# Patient Record
Sex: MALE | Race: BLACK OR AFRICAN AMERICAN | NOT HISPANIC OR LATINO | Employment: FULL TIME | URBAN - METROPOLITAN AREA
[De-identification: names, ages, dates, MRNs, and addresses within clinical notes are randomized per-mention and may not be internally consistent; named-entity substitution may affect disease eponyms.]

---

## 2023-10-11 ENCOUNTER — HOSPITAL ENCOUNTER (INPATIENT)
Facility: HOSPITAL | Age: 38
LOS: 1 days | Discharge: HOME | End: 2023-10-13
Attending: EMERGENCY MEDICINE | Admitting: INTERNAL MEDICINE
Payer: COMMERCIAL

## 2023-10-11 DIAGNOSIS — I10 HYPERTENSION: ICD-10-CM

## 2023-10-11 DIAGNOSIS — I16.0 HYPERTENSIVE URGENCY: ICD-10-CM

## 2023-10-11 DIAGNOSIS — R42 DIZZINESS: Primary | ICD-10-CM

## 2023-10-11 PROCEDURE — 96361 HYDRATE IV INFUSION ADD-ON: CPT

## 2023-10-11 PROCEDURE — 96374 THER/PROPH/DIAG INJ IV PUSH: CPT

## 2023-10-11 PROCEDURE — 99284 EMERGENCY DEPT VISIT MOD MDM: CPT

## 2023-10-11 PROCEDURE — 36415 COLL VENOUS BLD VENIPUNCTURE: CPT | Performed by: PHYSICIAN ASSISTANT

## 2023-10-11 PROCEDURE — 84484 ASSAY OF TROPONIN QUANT: CPT | Performed by: PHYSICIAN ASSISTANT

## 2023-10-11 PROCEDURE — 85025 COMPLETE CBC W/AUTO DIFF WBC: CPT | Performed by: PHYSICIAN ASSISTANT

## 2023-10-11 PROCEDURE — 93005 ELECTROCARDIOGRAM TRACING: CPT

## 2023-10-11 PROCEDURE — 80048 BASIC METABOLIC PNL TOTAL CA: CPT | Performed by: PHYSICIAN ASSISTANT

## 2023-10-11 PROCEDURE — 84443 ASSAY THYROID STIM HORMONE: CPT | Performed by: PHYSICIAN ASSISTANT

## 2023-10-11 RX ORDER — ONDANSETRON 2 MG/ML
4 INJECTION INTRAMUSCULAR; INTRAVENOUS ONCE
Status: COMPLETED | OUTPATIENT
Start: 2023-10-11 | End: 2023-10-11

## 2023-10-11 RX ORDER — MECLIZINE HYDROCHLORIDE 25 MG/1
25 TABLET ORAL ONCE
Status: COMPLETED | OUTPATIENT
Start: 2023-10-11 | End: 2023-10-11

## 2023-10-11 RX ADMIN — ONDANSETRON 4 MG: 2 INJECTION INTRAMUSCULAR; INTRAVENOUS at 23:43

## 2023-10-11 RX ADMIN — MECLIZINE HYDROCHLORIDE 25 MG: 25 TABLET ORAL at 23:44

## 2023-10-11 RX ADMIN — SODIUM CHLORIDE 1000 ML: 0.9 INJECTION, SOLUTION INTRAVENOUS at 23:43

## 2023-10-11 NOTE — Clinical Note
Case was discussed with [facility] and the patient's admission status was agreed to be Admission Status: observation status to the service of Dr. Miller

## 2023-10-12 ENCOUNTER — APPOINTMENT (EMERGENCY)
Dept: CT IMAGING | Facility: HOSPITAL | Age: 38
End: 2023-10-12
Payer: COMMERCIAL

## 2023-10-12 PROBLEM — R42 DIZZINESS: Status: ACTIVE | Noted: 2023-10-12

## 2023-10-12 PROBLEM — I16.0 HYPERTENSIVE URGENCY: Status: ACTIVE | Noted: 2023-10-12

## 2023-10-12 LAB
2HR DELTA HS TROPONIN: 1 NG/L
ANION GAP SERPL CALCULATED.3IONS-SCNC: 8 MMOL/L
ATRIAL RATE: 56 BPM
ATRIAL RATE: 84 BPM
BASOPHILS # BLD AUTO: 0.04 THOUSANDS/ÂΜL (ref 0–0.1)
BASOPHILS NFR BLD AUTO: 0 % (ref 0–1)
BUN SERPL-MCNC: 13 MG/DL (ref 5–25)
CALCIUM SERPL-MCNC: 9.2 MG/DL (ref 8.4–10.2)
CARDIAC TROPONIN I PNL SERPL HS: 5 NG/L
CARDIAC TROPONIN I PNL SERPL HS: 6 NG/L
CHLORIDE SERPL-SCNC: 105 MMOL/L (ref 96–108)
CO2 SERPL-SCNC: 25 MMOL/L (ref 21–32)
CREAT SERPL-MCNC: 0.94 MG/DL (ref 0.6–1.3)
EOSINOPHIL # BLD AUTO: 0.14 THOUSAND/ÂΜL (ref 0–0.61)
EOSINOPHIL NFR BLD AUTO: 2 % (ref 0–6)
ERYTHROCYTE [DISTWIDTH] IN BLOOD BY AUTOMATED COUNT: 11.7 % (ref 11.6–15.1)
GFR SERPL CREATININE-BSD FRML MDRD: 101 ML/MIN/1.73SQ M
GLUCOSE SERPL-MCNC: 113 MG/DL (ref 65–140)
HCT VFR BLD AUTO: 42.2 % (ref 36.5–49.3)
HGB BLD-MCNC: 14.2 G/DL (ref 12–17)
IMM GRANULOCYTES # BLD AUTO: 0.04 THOUSAND/UL (ref 0–0.2)
IMM GRANULOCYTES NFR BLD AUTO: 0 % (ref 0–2)
LYMPHOCYTES # BLD AUTO: 4.26 THOUSANDS/ÂΜL (ref 0.6–4.47)
LYMPHOCYTES NFR BLD AUTO: 47 % (ref 14–44)
MCH RBC QN AUTO: 29.1 PG (ref 26.8–34.3)
MCHC RBC AUTO-ENTMCNC: 33.6 G/DL (ref 31.4–37.4)
MCV RBC AUTO: 87 FL (ref 82–98)
MONOCYTES # BLD AUTO: 0.6 THOUSAND/ÂΜL (ref 0.17–1.22)
MONOCYTES NFR BLD AUTO: 7 % (ref 4–12)
NEUTROPHILS # BLD AUTO: 4.05 THOUSANDS/ÂΜL (ref 1.85–7.62)
NEUTS SEG NFR BLD AUTO: 44 % (ref 43–75)
NRBC BLD AUTO-RTO: 0 /100 WBCS
P AXIS: 44 DEGREES
P AXIS: 64 DEGREES
PLATELET # BLD AUTO: 289 THOUSANDS/UL (ref 149–390)
PMV BLD AUTO: 9.9 FL (ref 8.9–12.7)
POTASSIUM SERPL-SCNC: 3.5 MMOL/L (ref 3.5–5.3)
PR INTERVAL: 134 MS
PR INTERVAL: 148 MS
QRS AXIS: 40 DEGREES
QRS AXIS: 51 DEGREES
QRSD INTERVAL: 90 MS
QRSD INTERVAL: 90 MS
QT INTERVAL: 386 MS
QT INTERVAL: 440 MS
QTC INTERVAL: 424 MS
QTC INTERVAL: 456 MS
RBC # BLD AUTO: 4.88 MILLION/UL (ref 3.88–5.62)
SODIUM SERPL-SCNC: 138 MMOL/L (ref 135–147)
T WAVE AXIS: 13 DEGREES
T WAVE AXIS: 13 DEGREES
TSH SERPL DL<=0.05 MIU/L-ACNC: 3.18 UIU/ML (ref 0.45–4.5)
VENTRICULAR RATE: 56 BPM
VENTRICULAR RATE: 84 BPM
WBC # BLD AUTO: 9.13 THOUSAND/UL (ref 4.31–10.16)

## 2023-10-12 PROCEDURE — 70498 CT ANGIOGRAPHY NECK: CPT

## 2023-10-12 PROCEDURE — 36415 COLL VENOUS BLD VENIPUNCTURE: CPT | Performed by: PHYSICIAN ASSISTANT

## 2023-10-12 PROCEDURE — G1004 CDSM NDSC: HCPCS

## 2023-10-12 PROCEDURE — 70496 CT ANGIOGRAPHY HEAD: CPT

## 2023-10-12 PROCEDURE — 93005 ELECTROCARDIOGRAM TRACING: CPT

## 2023-10-12 PROCEDURE — 99285 EMERGENCY DEPT VISIT HI MDM: CPT | Performed by: PHYSICIAN ASSISTANT

## 2023-10-12 PROCEDURE — 84484 ASSAY OF TROPONIN QUANT: CPT | Performed by: PHYSICIAN ASSISTANT

## 2023-10-12 PROCEDURE — 93010 ELECTROCARDIOGRAM REPORT: CPT | Performed by: INTERNAL MEDICINE

## 2023-10-12 PROCEDURE — 99222 1ST HOSP IP/OBS MODERATE 55: CPT | Performed by: INTERNAL MEDICINE

## 2023-10-12 PROCEDURE — 96361 HYDRATE IV INFUSION ADD-ON: CPT

## 2023-10-12 PROCEDURE — 96375 TX/PRO/DX INJ NEW DRUG ADDON: CPT

## 2023-10-12 RX ORDER — SODIUM CHLORIDE 9 MG/ML
100 INJECTION, SOLUTION INTRAVENOUS CONTINUOUS
Status: DISCONTINUED | OUTPATIENT
Start: 2023-10-12 | End: 2023-10-12

## 2023-10-12 RX ORDER — HYDRALAZINE HYDROCHLORIDE 20 MG/ML
10 INJECTION INTRAMUSCULAR; INTRAVENOUS EVERY 6 HOURS PRN
Status: DISCONTINUED | OUTPATIENT
Start: 2023-10-12 | End: 2023-10-13 | Stop reason: HOSPADM

## 2023-10-12 RX ORDER — AMLODIPINE BESYLATE 5 MG/1
5 TABLET ORAL DAILY
Status: DISCONTINUED | OUTPATIENT
Start: 2023-10-12 | End: 2023-10-12

## 2023-10-12 RX ORDER — MAGNESIUM HYDROXIDE/ALUMINUM HYDROXICE/SIMETHICONE 120; 1200; 1200 MG/30ML; MG/30ML; MG/30ML
30 SUSPENSION ORAL EVERY 6 HOURS PRN
Status: DISCONTINUED | OUTPATIENT
Start: 2023-10-12 | End: 2023-10-13 | Stop reason: HOSPADM

## 2023-10-12 RX ORDER — ACETAMINOPHEN 325 MG/1
975 TABLET ORAL EVERY 6 HOURS PRN
Status: DISCONTINUED | OUTPATIENT
Start: 2023-10-12 | End: 2023-10-13

## 2023-10-12 RX ORDER — AMLODIPINE BESYLATE 5 MG/1
5 TABLET ORAL ONCE
Status: COMPLETED | OUTPATIENT
Start: 2023-10-12 | End: 2023-10-12

## 2023-10-12 RX ORDER — HYDROCHLOROTHIAZIDE 25 MG/1
25 TABLET ORAL DAILY
Status: DISCONTINUED | OUTPATIENT
Start: 2023-10-12 | End: 2023-10-13 | Stop reason: HOSPADM

## 2023-10-12 RX ORDER — DIAZEPAM 5 MG/1
5 TABLET ORAL EVERY 6 HOURS PRN
Status: DISCONTINUED | OUTPATIENT
Start: 2023-10-12 | End: 2023-10-13 | Stop reason: HOSPADM

## 2023-10-12 RX ORDER — AMLODIPINE BESYLATE 10 MG/1
10 TABLET ORAL DAILY
Status: DISCONTINUED | OUTPATIENT
Start: 2023-10-13 | End: 2023-10-13 | Stop reason: HOSPADM

## 2023-10-12 RX ORDER — HYDRALAZINE HYDROCHLORIDE 25 MG/1
25 TABLET, FILM COATED ORAL ONCE
Status: COMPLETED | OUTPATIENT
Start: 2023-10-12 | End: 2023-10-12

## 2023-10-12 RX ORDER — MECLIZINE HCL 12.5 MG/1
25 TABLET ORAL EVERY 8 HOURS SCHEDULED
Status: DISCONTINUED | OUTPATIENT
Start: 2023-10-12 | End: 2023-10-13 | Stop reason: HOSPADM

## 2023-10-12 RX ORDER — ONDANSETRON 2 MG/ML
4 INJECTION INTRAMUSCULAR; INTRAVENOUS EVERY 6 HOURS PRN
Status: DISCONTINUED | OUTPATIENT
Start: 2023-10-12 | End: 2023-10-13 | Stop reason: HOSPADM

## 2023-10-12 RX ORDER — DIAZEPAM 5 MG/ML
5 INJECTION, SOLUTION INTRAMUSCULAR; INTRAVENOUS ONCE
Status: COMPLETED | OUTPATIENT
Start: 2023-10-12 | End: 2023-10-12

## 2023-10-12 RX ADMIN — HYDRALAZINE HYDROCHLORIDE 10 MG: 20 INJECTION, SOLUTION INTRAMUSCULAR; INTRAVENOUS at 09:53

## 2023-10-12 RX ADMIN — SODIUM CHLORIDE 100 ML/HR: 0.9 INJECTION, SOLUTION INTRAVENOUS at 07:19

## 2023-10-12 RX ADMIN — HYDROCHLOROTHIAZIDE 25 MG: 25 TABLET ORAL at 15:21

## 2023-10-12 RX ADMIN — IOHEXOL 85 ML: 350 INJECTION, SOLUTION INTRAVENOUS at 00:24

## 2023-10-12 RX ADMIN — HYDRALAZINE HYDROCHLORIDE 25 MG: 25 TABLET, FILM COATED ORAL at 06:19

## 2023-10-12 RX ADMIN — AMLODIPINE BESYLATE 5 MG: 5 TABLET ORAL at 15:21

## 2023-10-12 RX ADMIN — AMLODIPINE BESYLATE 5 MG: 5 TABLET ORAL at 08:35

## 2023-10-12 RX ADMIN — ACETAMINOPHEN 325MG 975 MG: 325 TABLET ORAL at 21:30

## 2023-10-12 RX ADMIN — DIAZEPAM 5 MG: 5 INJECTION INTRAMUSCULAR; INTRAVENOUS at 03:13

## 2023-10-12 RX ADMIN — MECLIZINE HYDROCHLORIDE 25 MG: 25 TABLET ORAL at 06:42

## 2023-10-12 NOTE — PLAN OF CARE
Problem: NEUROSENSORY - ADULT  Goal: Achieves stable or improved neurological status  Description: INTERVENTIONS  - Monitor and report changes in neurological status  - Monitor vital signs such as temperature, blood pressure, glucose, and any other labs ordered   - Initiate measures to prevent increased intracranial pressure  - Monitor for seizure activity and implement precautions if appropriate      Outcome: Progressing     Problem: CARDIOVASCULAR - ADULT  Goal: Maintains optimal cardiac output and hemodynamic stability  Description: INTERVENTIONS:  - Monitor I/O, vital signs and rhythm  - Monitor for S/S and trends of decreased cardiac output  - Administer and titrate ordered vasoactive medications to optimize hemodynamic stability  - Assess quality of pulses, skin color and temperature  - Assess for signs of decreased coronary artery perfusion  - Instruct patient to report change in severity of symptoms  Outcome: Progressing     Problem: SAFETY ADULT  Goal: Patient will remain free of falls  Description: INTERVENTIONS:  - Educate patient/family on patient safety including physical limitations  - Instruct patient to call for assistance with activity   - Consult OT/PT to assist with strengthening/mobility   - Keep Call bell within reach  - Keep bed low and locked with side rails adjusted as appropriate  - Keep care items and personal belongings within reach  - Initiate and maintain comfort rounds  - Make Fall Risk Sign visible to staff  - Offer Toileting every 2 Hours, in advance of need  - Initiate/Maintain bed alarm  - Obtain necessary fall risk management equipment: alarm  - Apply yellow socks and bracelet for high fall risk patients  - Consider moving patient to room near nurses station  Outcome: Progressing  Goal: Maintain or return to baseline ADL function  Description: INTERVENTIONS:  -  Assess patient's ability to carry out ADLs; assess patient's baseline for ADL function and identify physical deficits which impact ability to perform ADLs (bathing, care of mouth/teeth, toileting, grooming, dressing, etc.)  - Assess/evaluate cause of self-care deficits   - Assess range of motion  - Assess patient's mobility; develop plan if impaired  - Assess patient's need for assistive devices and provide as appropriate  - Encourage maximum independence but intervene and supervise when necessary  - Involve family in performance of ADLs  - Assess for home care needs following discharge   - Consider OT consult to assist with ADL evaluation and planning for discharge  - Provide patient education as appropriate  Outcome: Progressing  Goal: Maintains/Returns to pre admission functional level  Description: INTERVENTIONS:  - Perform BMAT or MOVE assessment daily.   - Set and communicate daily mobility goal to care team and patient/family/caregiver. - Collaborate with rehabilitation services on mobility goals if consulted  - Perform Range of Motion 3 times a day. - Reposition patient every 2 hours.   - Dangle patient 3 times a day  - Stand patient 3 times a day  - Ambulate patient 3 times a day  - Out of bed to chair 3 times a day   - Out of bed for meals 3 times a day  - Out of bed for toileting  - Record patient progress and toleration of activity level   Outcome: Progressing     Problem: DISCHARGE PLANNING  Goal: Discharge to home or other facility with appropriate resources  Description: INTERVENTIONS:  - Identify barriers to discharge w/patient and caregiver  - Arrange for needed discharge resources and transportation as appropriate  - Identify discharge learning needs (meds, wound care, etc.)  - Arrange for interpretive services to assist at discharge as needed  - Refer to Case Management Department for coordinating discharge planning if the patient needs post-hospital services based on physician/advanced practitioner order or complex needs related to functional status, cognitive ability, or social support system  Outcome: Progressing     Problem: Knowledge Deficit  Goal: Patient/family/caregiver demonstrates understanding of disease process, treatment plan, medications, and discharge instructions  Description: Complete learning assessment and assess knowledge base.   Interventions:  - Provide teaching at level of understanding  - Provide teaching via preferred learning methods  Outcome: Progressing

## 2023-10-12 NOTE — H&P
[facility]  H&P  Name: Melissa Morales 34 y.o. male I MRN: [id number]  Unit/Bed#: ED-15 I Date of Admission: 10/11/2023   Date of Service: 10/12/2023 I Hospital Day: 0      Assessment/Plan   * Hypertensive urgency  Assessment & Plan  /103, 186/110  Reports hx of elevated BP. Not maintained on antihypertensive  Start amlodipine 5 mg daily  Close BP monitoring    Dizziness  Assessment & Plan  Dizziness reported. +Nystagmus. No focal deficits  CTA H/N unremarkable  Reports some improvement with meclizine and Valium   Scheduled meclizine for now  Valium as needed  Neurochecks  Obtain orthostatic vital signs  PT OT consult  Vestibular rehab therapy         VTE Prophylaxis:  low risk score   / reason for no mechanical VTE prophylaxis ambulatory    Code Status: FC  POLST: POLST is not applicable to this patient  Discussion with family:     Anticipated Length of Stay:  Patient will be admitted on an Observation basis with an anticipated length of stay of  < 2 midnights. Justification for Hospital Stay: htn urgency, dizziness/peripheral vertigo    Total Time for Visit, including Counseling / Coordination of Care: 30 minutes. Greater than 50% of this total time spent on direct patient counseling and coordination of care. Chief Complaint:   dizziness    History of Present Illness:    Melissa Morales is a 34 y.o. male who presents with c/o dizziness. Unable to obtain history due to Valium given in ED. Review of Systems:    Review of Systems   Unable to perform ROS: Other       Past Medical and Surgical History:     History reviewed. No pertinent past medical history. History reviewed. No pertinent surgical history. Meds/Allergies:    Prior to Admission medications    Not on File     I have reviewed home medications using allscripts.     Allergies: No Known Allergies    Social History:     Marital Status: Single   Occupation:   Patient Pre-hospital Living Situation:   Patient Pre-hospital Level of Mobility: ambulatory  Patient Pre-hospital Diet Restrictions:   Substance Use History:   Social History     Substance and Sexual Activity   Alcohol Use None     Social History     Tobacco Use   Smoking Status Not on file   Smokeless Tobacco Not on file     Social History     Substance and Sexual Activity   Drug Use Not on file       Family History:    History reviewed. No pertinent family history. Physical Exam:     Vitals:   Blood Pressure: (!) 184/109 (10/12/23 0618)  Pulse: 57 (10/12/23 0618)  Temperature: 97.7 °F (36.5 °C) (10/12/23 0052)  Temp Source: Oral (10/12/23 0052)  Respirations: 16 (10/12/23 0618)  Weight - Scale: 102 kg (225 lb 15.5 oz) (10/11/23 2257)  SpO2: 95 % (10/12/23 0618)    Physical Exam  Constitutional:       General: He is not in acute distress. Appearance: Normal appearance. He is not ill-appearing, toxic-appearing or diaphoretic. HENT:      Head: Normocephalic and atraumatic. Nose: No congestion. Mouth/Throat:      Mouth: Mucous membranes are moist.   Eyes:      Comments: +nystagmus   Cardiovascular:      Rate and Rhythm: Normal rate and regular rhythm. Pulmonary:      Effort: Pulmonary effort is normal.      Breath sounds: Normal breath sounds. Abdominal:      General: Bowel sounds are normal.      Palpations: Abdomen is soft. Musculoskeletal:      Right lower leg: No edema. Left lower leg: No edema. Neurological:      Mental Status: He is alert and oriented to person, place, and time. Psychiatric:      Comments: Sedated       Additional Data:     Lab Results: I have personally reviewed pertinent reports.       Results from last 7 days   Lab Units 10/11/23  2341   WBC Thousand/uL 9.13   HEMOGLOBIN g/dL 14.2   HEMATOCRIT % 42.2   PLATELETS Thousands/uL 289   NEUTROS PCT % 44   LYMPHS PCT % 47*   MONOS PCT % 7   EOS PCT % 2     Results from last 7 days   Lab Units 10/11/23  2341   SODIUM mmol/L 138   POTASSIUM mmol/L 3.5   CHLORIDE mmol/L 105   CO2 mmol/L 25   BUN mg/dL 13   CREATININE mg/dL 0.94   ANION GAP mmol/L 8   CALCIUM mg/dL 9.2   GLUCOSE RANDOM mg/dL 113                       Imaging: I have personally reviewed pertinent reports. CTA head and neck with and without contrast   Final Result by Alma Delia Mary MD (10/12 0102)      No acute intracranial abnormality. Unremarkable CT angiogram of the head and neck. Workstation performed: [id number]             EKG, Pathology, and Other Studies Reviewed on Admission:   CTA H/N    Allscripts / Epic Records Reviewed: Yes     ** Please Note: This note has been constructed using a voice recognition system.  **

## 2023-10-12 NOTE — ASSESSMENT & PLAN NOTE
Dizziness reported. +Nystagmus.  No focal deficits  CTA H/N unremarkable  Reports some improvement with meclizine and Valium   Scheduled meclizine for now  Valium as needed  Neurochecks  Obtain orthostatic vital signs  PT OT consult  Vestibular rehab therapy

## 2023-10-12 NOTE — ED CARE HANDOFF
Emergency Department Sign Out Note        Sign out and transfer of care from [address] [place], [place] [address]. See Separate Emergency Department note. The patient, Garrick Pandya, was evaluated by the previous provider for dizziness, nausea, vomiting. Workup Completed:    Labs Reviewed   CBC AND DIFFERENTIAL - Abnormal       Result Value Ref Range Status    WBC 9.13  4.31 - 10.16 Thousand/uL Final    RBC 4.88  3.88 - 5.62 Million/uL Final    Hemoglobin 14.2  12.0 - 17.0 g/dL Final    Hematocrit 42.2  36.5 - 49.3 % Final    MCV 87  82 - 98 fL Final    MCH 29.1  26.8 - 34.3 pg Final    MCHC 33.6  31.4 - 37.4 g/dL Final    RDW 11.7  11.6 - 15.1 % Final    MPV 9.9  8.9 - 12.7 fL Final    Platelets 321  761 - 390 Thousands/uL Final    nRBC 0  /100 WBCs Final    Neutrophils Relative 44  43 - 75 % Final    Immat GRANS % 0  0 - 2 % Final    Lymphocytes Relative 47 (*) 14 - 44 % Final    Monocytes Relative 7  4 - 12 % Final    Eosinophils Relative 2  0 - 6 % Final    Basophils Relative 0  0 - 1 % Final    Neutrophils Absolute 4.05  1.85 - 7.62 Thousands/µL Final    Immature Grans Absolute 0.04  0.00 - 0.20 Thousand/uL Final    Lymphocytes Absolute 4.26  0.60 - 4.47 Thousands/µL Final    Monocytes Absolute 0.60  0.17 - 1.22 Thousand/µL Final    Eosinophils Absolute 0.14  0.00 - 0.61 Thousand/µL Final    Basophils Absolute 0.04  0.00 - 0.10 Thousands/µL Final   TSH, 3RD GENERATION WITH FREE T4 REFLEX - Normal    TSH 3RD GENERATON 3.177  0.450 - 4.500 uIU/mL Final    Comment: Adult TSH (3rd generation) reference range follows the recommended guidelines of the American Thyroid Association, January, 2020. HS TROPONIN I 0HR - Normal    hs TnI 0hr 5  "Refer to ACS Flowchart"- see link ng/L Final    Comment:                                              Initial (time 0) result  If >=50 ng/L, Myocardial injury suggested ;  Type of myocardial injury and treatment strategy  to be determined.   If 5-49 ng/L, a delta result at 2 hours and or 4 hours will be needed to further evaluate. If <4 ng/L, and chest pain has been >3 hours since onset, patient may qualify for discharge based on the HEART score in the ED. If <5 ng/L and <3hours since onset of chest pain, a delta result at 2 hours will be needed to further evaluate. HS Troponin 99th Percentile URL of a Health Population=12 ng/L with a 95% Confidence Interval of 8-18 ng/L. Second Troponin (time 2 hours)  If calculated delta >= 20 ng/L,  Myocardial injury suggested ; Type of myocardial injury and treatment strategy to be determined. If 5-49 ng/L and the calculated delta is 5-19 ng/L, consult medical service for evaluation. Continue evaluation for ischemia on ecg and other possible etiology and repeat hs troponin at 4 hours. If delta is <5 ng/L at 2 hours, consider discharge based on risk stratification via the HEART score (if in ED), or KARI risk score in IP/Observation. HS Troponin 99th Percentile URL of a Health Population=12 ng/L with a 95% Confidence Interval of 8-18 ng/L.   HS TROPONIN I 2HR - Normal    hs TnI 2hr 6  "Refer to ACS Flowchart"- see link ng/L Final    Comment:                                              Initial (time 0) result  If >=50 ng/L, Myocardial injury suggested ;  Type of myocardial injury and treatment strategy  to be determined. If 5-49 ng/L, a delta result at 2 hours and or 4 hours will be needed to further evaluate. If <4 ng/L, and chest pain has been >3 hours since onset, patient may qualify for discharge based on the HEART score in the ED. If <5 ng/L and <3hours since onset of chest pain, a delta result at 2 hours will be needed to further evaluate. HS Troponin 99th Percentile URL of a Health Population=12 ng/L with a 95% Confidence Interval of 8-18 ng/L. Second Troponin (time 2 hours)  If calculated delta >= 20 ng/L,  Myocardial injury suggested ; Type of myocardial injury and treatment strategy to be determined.   If 5-49 ng/L and the calculated delta is 5-19 ng/L, consult medical service for evaluation. Continue evaluation for ischemia on ecg and other possible etiology and repeat hs troponin at 4 hours. If delta is <5 ng/L at 2 hours, consider discharge based on risk stratification via the HEART score (if in ED), or KARI risk score in IP/Observation. HS Troponin 99th Percentile URL of a Health Population=12 ng/L with a 95% Confidence Interval of 8-18 ng/L. Delta 2hr hsTnI 1  <20 ng/L Final   BASIC METABOLIC PANEL    Sodium 289  135 - 147 mmol/L Final    Potassium 3.5  3.5 - 5.3 mmol/L Final    Chloride 105  96 - 108 mmol/L Final    CO2 25  21 - 32 mmol/L Final    ANION GAP 8  mmol/L Final    BUN 13  5 - 25 mg/dL Final    Creatinine 0.94  0.60 - 1.30 mg/dL Final    Comment: Standardized to IDMS reference method    Glucose 113  65 - 140 mg/dL Final    Comment: If the patient is fasting, the ADA then defines impaired fasting glucose as > 100 mg/dL and diabetes as > or equal to 123 mg/dL. Calcium 9.2  8.4 - 10.2 mg/dL Final    eGFR 101  ml/min/1.73sq m Final    Narrative:     [place] guidelines for Chronic Kidney Disease (CKD):     Stage 1 with normal or high GFR (GFR > 90 mL/min/1.73 square meters)    Stage 2 Mild CKD (GFR = 60-89 mL/min/1.73 square meters)    Stage 3A Moderate CKD (GFR = 45-59 mL/min/1.73 square meters)    Stage 3B Moderate CKD (GFR = 30-44 mL/min/1.73 square meters)    Stage 4 Severe CKD (GFR = 15-29 mL/min/1.73 square meters)    Stage 5 End Stage CKD (GFR <15 mL/min/1.73 square meters)  Note: GFR calculation is accurate only with a steady state creatinine         CTA head and neck with and without contrast   Final Result      No acute intracranial abnormality. Unremarkable CT angiogram of the head and neck. Workstation performed: [id number]               ED Course / Workup Pending (followup):          Pending:  CTA head/neck  Delta troponin    If normal imaging/delta trop then plan for d/c. Send with mary beth   Referral to [facility]                                 ED Course as of 10/12/23 2027   u Oct 12, 2023   0136 CTA IMPRESSION:     No acute intracranial abnormality. Unremarkable CT angiogram of the head and neck. 0225 Delta 2hr hsTnI: 1  2-hour EKG appeared nonischemic. Will defer further troponin testing.   0243 On bedside reevaluation patient resting comfortably in stretcher no acute distress. Reports his nausea has improved however symptoms of dizziness have persisted. Reports they are mild at rest and without movement but upon head movement and standing reports that the dizziness is exacerbated. No other complaints at this time. Communicate all findings from today's work with the patient at bedside. In light of persistent elevated blood pressure while in the ED along with persistent vertiginous symptoms will admit patient to medicine for reevaluation and further management. Patient agreeable to this plan. Will reach out to [facility].    [facility] 12 Lead Documentation Only    Date/Time: 10/12/2023 2:22 AM    Performed by: Ike Irvin PA-C  Authorized by: Ike Irvin PA-C    Indications / Diagnosis:  2 hour  ECG reviewed by me, the ED Provider: yes    Patient location:  ED  Rate:     ECG rate:  56    ECG rate assessment: bradycardic    Rhythm:     Rhythm: sinus bradycardia    Ectopy:     Ectopy: none    QRS:     QRS axis:  Normal    QRS intervals:  Normal  Conduction:     Conduction: normal    ST segments:     ST segments:  Normal  T waves:     T waves: inverted      Inverted:  III    Medical Decision Making  Amount and/or Complexity of Data Reviewed  Labs: ordered. Decision-making details documented in ED Course. Radiology: ordered. Risk  Prescription drug management. Decision regarding hospitalization.             Disposition  Final diagnoses:   Dizziness   Hypertension     Time reflects when diagnosis was documented in both MDM as applicable and the Disposition within this note       Time User Action Codes Description Comment    10/12/2023  7:02 AM Amina Mccoy [[id number]] Dizziness     10/12/2023  7:02 AM Amina Mccoy [[id number]] Hypertension           ED Disposition       ED Disposition   Admit    Condition   Stable    Date/Time   Thu Oct 12, 2023  7:02 AM    Comment   Case was discussed with [facility] and the patient's admission status was agreed to be Admission Status: observation status to the service of Dr. Manjeet Johnson . Follow-up Information    None       There are no discharge medications for this patient. No discharge procedures on file.        ED Provider  Electronically Signed by     Merary Thompson PA-C  10/12/23 2027

## 2023-10-12 NOTE — ASSESSMENT & PLAN NOTE
/103, 186/110  Reports hx of elevated BP.  Not maintained on antihypertensive  Start amlodipine 5 mg daily  Close BP monitoring

## 2023-10-12 NOTE — ED PROVIDER NOTES
History  Chief Complaint   Patient presents with    Dizziness     Pt arriving via ems c/o of dizziness that started an hour ago. Pt having nausea and one episode of vomiting. Intermittent headache. Denis Angelo is a 35 yo M presenting with dizziness with onset one hour prior to arrival. Reports yesterday he had a similar episode which lasted about 10 minutes but resolved spontaneously. Reports today was laying in bed and had abrupt onset of spinning. He describes associated nausea and episode of vomiting. Reports the dizziness does worsen with movements of head and positional changes, improves somewhat with laying still. Reports he has had slight discomfort in neck bilaterally since this morning, but no specific neck/head injury. No headaches noted. No numbness/tingling/weakness. No chest pain/dyspnea. Apart from yesterday, no previous history of similar vertigo. Reports elevated BP at previous physicals as well as today at triage, but does not follow with PCP and is not on antihypertensives. History provided by:  Patient   used: No        None       History reviewed. No pertinent past medical history. History reviewed. No pertinent surgical history. History reviewed. No pertinent family history. I have reviewed and agree with the history as documented. E-Cigarette/Vaping     E-Cigarette/Vaping Substances          Review of Systems   Constitutional:  Negative for chills and fever. HENT:  Negative for congestion, rhinorrhea and sore throat. Eyes:  Negative for pain and visual disturbance. Respiratory:  Negative for cough, shortness of breath and wheezing. Cardiovascular:  Negative for chest pain and palpitations. Gastrointestinal:  Negative for abdominal pain, nausea and vomiting. Genitourinary:  Negative for dysuria, frequency and urgency. Musculoskeletal:  Positive for neck pain. Negative for back pain and neck stiffness. Skin:  Negative for rash and wound. Neurological:  Positive for dizziness. Negative for weakness, light-headedness and numbness. Physical Exam  Physical Exam  Constitutional:       General: He is not in acute distress. Appearance: He is well-developed. He is not diaphoretic. HENT:      Head: Normocephalic and atraumatic. Right Ear: External ear normal.      Left Ear: External ear normal.   Eyes:      Extraocular Movements:      Right eye: Nystagmus present. Normal extraocular motion. Left eye: Nystagmus present. Normal extraocular motion. Conjunctiva/sclera: Conjunctivae normal.      Pupils: Pupils are equal, round, and reactive to light. Comments: Unidirectional leftward nystagmus. EOM's otherwise intact. No vertical nystagmus   Cardiovascular:      Rate and Rhythm: Normal rate and regular rhythm. Pulmonary:      Effort: Pulmonary effort is normal. No accessory muscle usage or respiratory distress. Breath sounds: No wheezing or rales. Abdominal:      General: Abdomen is flat. There is no distension. Tenderness: There is no abdominal tenderness. Musculoskeletal:      Cervical back: Normal range of motion. No rigidity. Skin:     General: Skin is warm and dry. Capillary Refill: Capillary refill takes less than 2 seconds. Findings: No erythema or rash. Neurological:      Mental Status: He is alert and oriented to person, place, and time. GCS: GCS eye subscore is 4. GCS verbal subscore is 5. GCS motor subscore is 6. Cranial Nerves: Cranial nerves 2-12 are intact. No cranial nerve deficit or facial asymmetry. Sensory: Sensation is intact. Motor: Motor function is intact. No weakness or abnormal muscle tone. Coordination: Coordination is intact. Coordination normal. Finger-Nose-Finger Test and Heel to [facility] Test normal.      Gait: Gait is intact. Psychiatric:         Behavior: Behavior normal.         Thought Content:  Thought content normal.         Judgment: Judgment normal.         Vital Signs  ED Triage Vitals [10/11/23 2257]   Temp Pulse Respirations Blood Pressure SpO2   -- 84 18 (!) 186/103 100 %      Temp src Heart Rate Source Patient Position - Orthostatic VS BP Location FiO2 (%)   -- -- Lying Left arm --      Pain Score       No Pain           Vitals:    10/11/23 2257 10/11/23 2300 10/11/23 2301   BP: (!) 186/103 (!) 186/110 (!) 162/106   Pulse: 84     Patient Position - Orthostatic VS: Lying Lying - Orthostatic VS Sitting - Orthostatic VS         Visual Acuity      ED Medications  Medications   meclizine (ANTIVERT) tablet 25 mg (25 mg Oral Given 10/11/23 2344)   sodium chloride 0.9 % bolus 1,000 mL (1,000 mL Intravenous New Bag 10/11/23 2343)   ondansetron (ZOFRAN) injection 4 mg (4 mg Intravenous Given 10/11/23 2343)   iohexol (OMNIPAQUE) 350 MG/ML injection (MULTI-DOSE) 85 mL (85 mL Intravenous Given 10/12/23 0024)       Diagnostic Studies  Results Reviewed       Procedure Component Value Units Date/Time    TSH, 3rd generation with Free T4 reflex [[id number]]  (Normal) Collected: 10/11/23 2341    Lab Status: Final result Specimen: Blood from Arm, Right Updated: 10/12/23 0023     TSH 3RD GENERATON 3.177 uIU/mL     HS Troponin I 2hr [[id number]]     Lab Status: No result Specimen: Blood     HS Troponin I 4hr [[id number]]     Lab Status: No result Specimen: Blood     HS Troponin 0hr (reflex protocol) [[id number]]  (Normal) Collected: 10/11/23 2341    Lab Status: Final result Specimen: Blood from Arm, Right Updated: 10/12/23 0014     hs TnI 0hr 5 ng/L     CBC and differential [[id number]]  (Abnormal) Collected: 10/11/23 2341    Lab Status: Final result Specimen: Blood from Arm, Right Updated: 10/12/23 0009     WBC 9.13 Thousand/uL      RBC 4.88 Million/uL      Hemoglobin 14.2 g/dL      Hematocrit 42.2 %      MCV 87 fL      MCH 29.1 pg      MCHC 33.6 g/dL      RDW 11.7 %      MPV 9.9 fL      Platelets 642 Thousands/uL      nRBC 0 /100 WBCs      Neutrophils Relative 44 % Immat GRANS % 0 %      Lymphocytes Relative 47 %      Monocytes Relative 7 %      Eosinophils Relative 2 %      Basophils Relative 0 %      Neutrophils Absolute 4.05 Thousands/µL      Immature Grans Absolute 0.04 Thousand/uL      Lymphocytes Absolute 4.26 Thousands/µL      Monocytes Absolute 0.60 Thousand/µL      Eosinophils Absolute 0.14 Thousand/µL      Basophils Absolute 0.04 Thousands/µL     Basic metabolic panel [[id number]] Collected: 10/11/23 2341    Lab Status: Final result Specimen: Blood from Arm, Right Updated: 10/12/23 0006     Sodium 138 mmol/L      Potassium 3.5 mmol/L      Chloride 105 mmol/L      CO2 25 mmol/L      ANION GAP 8 mmol/L      BUN 13 mg/dL      Creatinine 0.94 mg/dL      Glucose 113 mg/dL      Calcium 9.2 mg/dL      eGFR 101 ml/min/1.73sq m     Narrative:      [place] guidelines for Chronic Kidney Disease (CKD):     Stage 1 with normal or high GFR (GFR > 90 mL/min/1.73 square meters)    Stage 2 Mild CKD (GFR = 60-89 mL/min/1.73 square meters)    Stage 3A Moderate CKD (GFR = 45-59 mL/min/1.73 square meters)    Stage 3B Moderate CKD (GFR = 30-44 mL/min/1.73 square meters)    Stage 4 Severe CKD (GFR = 15-29 mL/min/1.73 square meters)    Stage 5 End Stage CKD (GFR <15 mL/min/1.73 square meters)  Note: GFR calculation is accurate only with a steady state creatinine                   CTA head and neck with and without contrast    (Results Pending)              Procedures  ECG 12 Lead Documentation Only    Date/Time: 10/11/2023 11:05 PM    Performed by: Jorge Archer PA-C  Authorized by: Jorge Archer PA-C    Indications / Diagnosis:  Dizziness  ECG reviewed by me, the ED Provider: yes    Patient location:  ED  Previous ECG:     Previous ECG:  Unavailable    Comparison to cardiac monitor: Yes    Interpretation:     Interpretation: normal    Quality:     Tracing quality:  Limited by artifact  Rate:     ECG rate:  84    ECG rate assessment: normal    Rhythm: Rhythm: sinus rhythm    Ectopy:     Ectopy: none    QRS:     QRS axis:  Normal    QRS intervals:  Normal  Conduction:     Conduction: normal    ST segments:     ST segments:  Normal  T waves:     T waves: normal             ED Course  ED Course as of 10/12/23 0046   Thu Oct 12, 2023   0045 Pt signed out to [employer] PA-C pending CTA, re-evaluation, delta troponin. Symptoms improved significantly since arrival after meclizine, nausea resolved. SBIRT 20yo+      Flowsheet Row Most Recent Value   Initial Alcohol Screen: US AUDIT-C     1. How often do you have a drink containing alcohol? 0 Filed at: 10/11/2023 2257   2. How many drinks containing alcohol do you have on a typical day you are drinking? 0 Filed at: 10/11/2023 2257   3a. Male UNDER 65: How often do you have five or more drinks on one occasion? 0 Filed at: 10/11/2023 2257   Audit-C Score 0 Filed at: 10/11/2023 2257   DEE: How many times in the past year have you. .. Used an illegal drug or used a prescription medication for non-medical reasons? Never Filed at: 10/11/2023 2257                      Medical Decision Making  Dizziness, initial short episode yesterday resolved and then recurrent today about 1 hour prior to arrival. Symptoms do appear positional in nature. Leftward unidirectional nystagmus noted, neurologic exam otherwise nonfocal. History/exam suggestive of peripheral vertigo. Given possible HTN history, new onset vertigo, and associated mild neck pain today plan for CTA head/neck. Will check labs including CBC for HgB, BMP for lytes, TSH for thyroid dysfunction, EKG/troponin for cardiac ischemia/ectopy. Will give meclizine/Zofran, IV fluids, reassess. Amount and/or Complexity of Data Reviewed  Labs: ordered. Radiology: ordered. Risk  Prescription drug management.              Disposition  Final diagnoses:   None     ED Disposition       None          Follow-up Information    None Patient's Medications    No medications on file       No discharge procedures on file.     PDMP Review       None            ED Provider  Electronically Signed by             Nilda Hopson PA-C  10/12/23 6109

## 2023-10-12 NOTE — ED NOTES
Pt ambulated to the bathroom without assistance.  PT states "I feel more steady on my feel and feel less vertigo symptoms"     Aleksey Deng RN  10/12/23 2099

## 2023-10-12 NOTE — UTILIZATION REVIEW
Initial Clinical Review    ED TREATMENT TIME 10/11 @  2319    Admission: Date/Time/Statement:   Admission Orders (From admission, onward)       Ordered        10/12/23 0628  Place in Observation  Once                          10/12/23 1604  Inpatient Admission  Once        Transfer Service: Hospitalist   Question Answer Comment   Level of Care Med Surg    Estimated length of stay More than 2 Midnights    Certification I certify that inpatient services are medically necessary for this patient for a duration of greater than two midnights. See H&P and MD Progress Notes for additional information about the patient's course of treatment. 10/12/23 1603     [facility]  ED Arrival Information       Expected   -    Arrival   10/11/2023 22:54    Acuity   Urgent              Means of arrival   Ambulance    Escorted by   [facility] ([address] [place] [address])    Service   Hospitalist    Admission type   Emergency              Arrival complaint   Dizzy             Chief Complaint   Patient presents with    Dizziness     Pt arriving via ems c/o of dizziness that started an hour ago. Pt having nausea and one episode of vomiting. Intermittent headache. Initial Presentation: 34 y.o. male presents to ED from home with Dizziness. Unable to obatin history after  receiving valium in ED. CTA  head  unremarkable. BP  in ED   186/103, 186/110. Has  H/O  hypertension. Admit  Observation with  Hypertensive urgency, dizziness and plan is  PT/OT, vestibular rehab therapy, monitor BP, neuro checks, orthostatic  vital signs and start amlodipine. 10/12  IP  ADMISSIOn  Continue  neuro checks  Q 4 hrs. Monitor  BP. Needs  PT/OT. Continue meclizine, valium as needed.         ED Triage Vitals   Temperature Pulse Respirations Blood Pressure SpO2   10/12/23 0052 10/11/23 2257 10/11/23 2257 10/11/23 2257 10/11/23 2257   97.7 °F (36.5 °C) 84 18 (!) 186/103 100 %      Temp Source Heart Rate Source Patient Position - Orthostatic VS BP Location FiO2 (%)   10/12/23 0052 10/12/23 0052 10/11/23 2257 10/11/23 2257 --   Oral Monitor Lying Left arm       Pain Score       10/11/23 2257       No Pain          Wt Readings from Last 1 Encounters:   10/11/23 102 kg (225 lb 15.5 oz)     Additional Vital Signs:   -- 58 18 162/95 134 97 % None (Room air) Lying    10/12/23 0835 -- -- -- 162/95 -- -- -- --   10/12/23 0721 -- 90 16 195/112 Abnormal  138 97 % None (Room air) Standing   10/12/23 0719 -- -- -- 195/112 Abnormal  -- -- -- Standing for 3 minutes - Orthostatic VS   10/12/23 0714 -- 82 18 165/107 Abnormal  -- -- -- --   10/12/23 0712 -- 68 14 161/95 -- -- -- Sitting - Orthostatic VS   10/12/23 0710 -- 54 Abnormal  14 167/90 -- -- -- Lying - Orthostatic VS   10/12/23 0641 -- -- -- 161/103 Abnormal  -- -- -- Lying   10/12/23 0618 -- 57 16 184/109 Abnormal  -- 95 % None (Room air) Lying   10/12/23 0510 -- 60 16 169/111 Abnormal  -- 99 % None (Room air) Lying   10/12/23 0315 -- 60 20 173/111 Abnormal  136 97 % None (Room air) Lying   10/12/23 0300 -- 63 17 175/109 Abnormal  134 97 % None (Room air) Lying   10/12/23 0245 -- 64 16 185/98 Abnormal  131 97 % None (Room air) Lying   10/12/23 0230 -- 71 16 172/99 Abnormal  130 95 % None (Room air) Lying   10/12/23 0215 -- 59 16 157/101 Abnormal  125 95 % None (Room air) Lying   10/12/23 0145 -- 50 Abnormal  15 171/106 Abnormal  134 97 % None (Room air) Lying   10/12/23 0130 -- 55 16 174/106 Abnormal  134 97 % None (Room air) Lying   10/12/23 0115 -- 60 20 193/111 Abnormal  146 99 % None (Room air) Lying   10/12/23 0100 -- 51 Abnormal  18 185/120 Abnormal  148 98 % None (Room air) Lying   10/12/23 0052 97.7 °F (36.5 °C) 52 Abnormal  18 181/118 Abnormal  -- 99 % None (Room air) Lying   10/11/23 2301 -- -- -- 162/106 Abnormal  -- -- -- Sitting - Orthostatic VS   10/11/23 2300 -- -- -- 186/110 Abnormal  -- -- -- Lying - Orthostatic VS   10/11/23 2257 -- 84 18 186/103 Abnormal  -- 100 % None (Room air) Lying       Pertinent Labs/Diagnostic Test Results:   CTA head and neck with and without contrast   Final Result by Bibi Mendoza MD (10/12 0102)      No acute intracranial abnormality. Unremarkable CT angiogram of the head and neck.             Workstation performed: [id number]               Results from last 7 days   Lab Units 10/11/23  2341   WBC Thousand/uL 9.13   HEMOGLOBIN g/dL 14.2   HEMATOCRIT % 42.2   PLATELETS Thousands/uL 289   NEUTROS ABS Thousands/µL 4.05         Results from last 7 days   Lab Units 10/11/23  2341   SODIUM mmol/L 138   POTASSIUM mmol/L 3.5   CHLORIDE mmol/L 105   CO2 mmol/L 25   ANION GAP mmol/L 8   BUN mg/dL 13   CREATININE mg/dL 0.94   EGFR ml/min/1.73sq m 101   CALCIUM mg/dL 9.2             Results from last 7 days   Lab Units 10/11/23  2341   GLUCOSE RANDOM mg/dL 113               Results from last 7 days   Lab Units 10/12/23  0133 10/11/23  2341   HS TNI 0HR ng/L  --  5   HS TNI 2HR ng/L 6  --    HSTNI D2 ng/L 1  --              Results from last 7 days   Lab Units 10/11/23  2341   TSH 3RD GENERATON uIU/mL 3.177                 ED Treatment:   Medication Administration from 10/11/2023 2254 to 10/12/2023 0911         Date/Time Order Dose Route Action Comments     10/11/2023 2344 EDT meclizine (ANTIVERT) tablet 25 mg 25 mg Oral Given --     10/12/2023 0205 EDT sodium chloride 0.9 % bolus 1,000 mL 0 mL Intravenous Stopped --     10/11/2023 2343 EDT sodium chloride 0.9 % bolus 1,000 mL 1,000 mL Intravenous New Bag --     10/11/2023 2343 EDT ondansetron (ZOFRAN) injection 4 mg 4 mg Intravenous Given --     10/12/2023 0024 EDT iohexol (OMNIPAQUE) 350 MG/ML injection (MULTI-DOSE) 85 mL 85 mL Intravenous Given --     10/12/2023 0313 EDT diazepam (VALIUM) injection 5 mg 5 mg Intravenous Given --     10/12/2023 6616 EDT hydrALAZINE (APRESOLINE) tablet 25 mg 25 mg Oral Given --     10/12/2023 3599 EDT meclizine (ANTIVERT) tablet 25 mg 25 mg Oral Given --     10/12/2023 0719 EDT sodium chloride 0.9 % infusion 100 mL/hr Intravenous New Bag --     10/12/2023 0835 EDT amLODIPine (NORVASC) tablet 5 mg 5 mg Oral Given --          History reviewed. No pertinent past medical history. Present on Admission:   Dizziness      Admitting Diagnosis: No admission diagnoses are documented for this encounter. Age/Sex: 34 y.o. male  Admission Orders:  Scheduled Medications:  amLODIPine, 5 mg, Oral, Daily  meclizine, 25 mg, Oral, Q8H [address]      Continuous IV Infusions:  sodium chloride, 100 mL/hr, Intravenous, Continuous      PRN Meds:  hydrALAZINE, 10 mg, Intravenous, Q6H PRN  ( x1  10/12 thus far)      Orthostatic  BP  X 1  Neuro checks  Q 4 hrs    Network Utilization Review Department  ATTENTION: Please call with any questions or concerns to [phone] and carefully listen to the prompts so that you are directed to the right person. All voicemails are confidential.   For Discharge needs, contact Care Management DC Support Team at [phone] opt. 2  Send all requests for admission clinical reviews, approved or denied determinations and any other requests to dedicated fax number below belonging to the campus where the patient is receiving treatment.  List of dedicated fax numbers for the Facilities:  [place] DENIALS (Administrative/Medical Necessity) [phone]   DISCHARGE SUPPORT TEAM (NETWORK) [id number] [facility] (Maternity/NICU/Pediatrics) [phone]   [address] [place] [address] Drive [phone]   [facility] [address] [facility] [phone] [address] [place] [address] [address] - [facility]  [address] [phone]

## 2023-10-13 VITALS
HEIGHT: 72 IN | TEMPERATURE: 98 F | WEIGHT: 225.97 LBS | SYSTOLIC BLOOD PRESSURE: 142 MMHG | HEART RATE: 71 BPM | RESPIRATION RATE: 18 BRPM | BODY MASS INDEX: 30.61 KG/M2 | OXYGEN SATURATION: 96 % | DIASTOLIC BLOOD PRESSURE: 99 MMHG

## 2023-10-13 LAB
ANION GAP SERPL CALCULATED.3IONS-SCNC: 6 MMOL/L
ATRIAL RATE: 94 BPM
BASOPHILS # BLD AUTO: 0.02 THOUSANDS/ÂΜL (ref 0–0.1)
BASOPHILS NFR BLD AUTO: 0 % (ref 0–1)
BILIRUB UR QL STRIP: NEGATIVE
BUN SERPL-MCNC: 11 MG/DL (ref 5–25)
CALCIUM SERPL-MCNC: 9.2 MG/DL (ref 8.4–10.2)
CHLORIDE SERPL-SCNC: 105 MMOL/L (ref 96–108)
CLARITY UR: CLEAR
CO2 SERPL-SCNC: 26 MMOL/L (ref 21–32)
COLOR UR: NORMAL
CREAT SERPL-MCNC: 0.88 MG/DL (ref 0.6–1.3)
CREAT UR-MCNC: 86.8 MG/DL
EOSINOPHIL # BLD AUTO: 0.11 THOUSAND/ÂΜL (ref 0–0.61)
EOSINOPHIL NFR BLD AUTO: 2 % (ref 0–6)
ERYTHROCYTE [DISTWIDTH] IN BLOOD BY AUTOMATED COUNT: 11.9 % (ref 11.6–15.1)
GFR SERPL CREATININE-BSD FRML MDRD: 107 ML/MIN/1.73SQ M
GLUCOSE SERPL-MCNC: 94 MG/DL (ref 65–140)
GLUCOSE UR STRIP-MCNC: NEGATIVE MG/DL
HCT VFR BLD AUTO: 45 % (ref 36.5–49.3)
HGB BLD-MCNC: 14.6 G/DL (ref 12–17)
HGB UR QL STRIP.AUTO: NEGATIVE
IMM GRANULOCYTES # BLD AUTO: 0.01 THOUSAND/UL (ref 0–0.2)
IMM GRANULOCYTES NFR BLD AUTO: 0 % (ref 0–2)
KETONES UR STRIP-MCNC: NEGATIVE MG/DL
LEUKOCYTE ESTERASE UR QL STRIP: NEGATIVE
LYMPHOCYTES # BLD AUTO: 2.36 THOUSANDS/ÂΜL (ref 0.6–4.47)
LYMPHOCYTES NFR BLD AUTO: 41 % (ref 14–44)
MCH RBC QN AUTO: 28.7 PG (ref 26.8–34.3)
MCHC RBC AUTO-ENTMCNC: 32.4 G/DL (ref 31.4–37.4)
MCV RBC AUTO: 89 FL (ref 82–98)
MICROALBUMIN UR-MCNC: <7 MG/L
MICROALBUMIN/CREAT 24H UR: <8 MG/G CREATININE (ref 0–30)
MONOCYTES # BLD AUTO: 0.34 THOUSAND/ÂΜL (ref 0.17–1.22)
MONOCYTES NFR BLD AUTO: 6 % (ref 4–12)
NEUTROPHILS # BLD AUTO: 2.91 THOUSANDS/ÂΜL (ref 1.85–7.62)
NEUTS SEG NFR BLD AUTO: 51 % (ref 43–75)
NITRITE UR QL STRIP: NEGATIVE
NRBC BLD AUTO-RTO: 0 /100 WBCS
P AXIS: 55 DEGREES
PH UR STRIP.AUTO: 5.5 [PH]
PLATELET # BLD AUTO: 254 THOUSANDS/UL (ref 149–390)
PMV BLD AUTO: 9.4 FL (ref 8.9–12.7)
POTASSIUM SERPL-SCNC: 4 MMOL/L (ref 3.5–5.3)
PR INTERVAL: 142 MS
PROT UR STRIP-MCNC: NEGATIVE MG/DL
QRS AXIS: 93 DEGREES
QRSD INTERVAL: 82 MS
QT INTERVAL: 362 MS
QTC INTERVAL: 452 MS
RBC # BLD AUTO: 5.08 MILLION/UL (ref 3.88–5.62)
SODIUM SERPL-SCNC: 137 MMOL/L (ref 135–147)
SP GR UR STRIP.AUTO: 1.01 (ref 1–1.03)
T WAVE AXIS: -4 DEGREES
UROBILINOGEN UR STRIP-ACNC: <2 MG/DL
VENTRICULAR RATE: 94 BPM
WBC # BLD AUTO: 5.75 THOUSAND/UL (ref 4.31–10.16)

## 2023-10-13 PROCEDURE — 99239 HOSP IP/OBS DSCHRG MGMT >30: CPT | Performed by: INTERNAL MEDICINE

## 2023-10-13 PROCEDURE — 82043 UR ALBUMIN QUANTITATIVE: CPT | Performed by: INTERNAL MEDICINE

## 2023-10-13 PROCEDURE — 97162 PT EVAL MOD COMPLEX 30 MIN: CPT

## 2023-10-13 PROCEDURE — 93010 ELECTROCARDIOGRAM REPORT: CPT | Performed by: INTERNAL MEDICINE

## 2023-10-13 PROCEDURE — 82570 ASSAY OF URINE CREATININE: CPT | Performed by: INTERNAL MEDICINE

## 2023-10-13 PROCEDURE — 84244 ASSAY OF RENIN: CPT | Performed by: INTERNAL MEDICINE

## 2023-10-13 PROCEDURE — 80048 BASIC METABOLIC PNL TOTAL CA: CPT | Performed by: NURSE PRACTITIONER

## 2023-10-13 PROCEDURE — 82088 ASSAY OF ALDOSTERONE: CPT | Performed by: INTERNAL MEDICINE

## 2023-10-13 PROCEDURE — 85025 COMPLETE CBC W/AUTO DIFF WBC: CPT | Performed by: NURSE PRACTITIONER

## 2023-10-13 PROCEDURE — 81003 URINALYSIS AUTO W/O SCOPE: CPT | Performed by: INTERNAL MEDICINE

## 2023-10-13 PROCEDURE — 93005 ELECTROCARDIOGRAM TRACING: CPT

## 2023-10-13 RX ORDER — HYDROCHLOROTHIAZIDE 25 MG/1
25 TABLET ORAL DAILY
Qty: 30 TABLET | Refills: 0 | Status: SHIPPED | OUTPATIENT
Start: 2023-10-14

## 2023-10-13 RX ORDER — AMLODIPINE BESYLATE 10 MG/1
10 TABLET ORAL DAILY
Qty: 30 TABLET | Refills: 0 | Status: SHIPPED | OUTPATIENT
Start: 2023-10-14

## 2023-10-13 RX ORDER — ISOSORBIDE MONONITRATE 30 MG/1
30 TABLET, EXTENDED RELEASE ORAL DAILY
Qty: 30 TABLET | Refills: 0 | Status: SHIPPED | OUTPATIENT
Start: 2023-10-14

## 2023-10-13 RX ORDER — ISOSORBIDE MONONITRATE 30 MG/1
30 TABLET, EXTENDED RELEASE ORAL DAILY
Status: DISCONTINUED | OUTPATIENT
Start: 2023-10-13 | End: 2023-10-13 | Stop reason: HOSPADM

## 2023-10-13 RX ORDER — IBUPROFEN 400 MG/1
400 TABLET ORAL EVERY 6 HOURS PRN
Status: DISCONTINUED | OUTPATIENT
Start: 2023-10-13 | End: 2023-10-13 | Stop reason: HOSPADM

## 2023-10-13 RX ADMIN — ACETAMINOPHEN 325MG 975 MG: 325 TABLET ORAL at 05:44

## 2023-10-13 RX ADMIN — IBUPROFEN 400 MG: 400 TABLET, FILM COATED ORAL at 08:38

## 2023-10-13 RX ADMIN — HYDRALAZINE HYDROCHLORIDE 10 MG: 20 INJECTION, SOLUTION INTRAMUSCULAR; INTRAVENOUS at 05:44

## 2023-10-13 RX ADMIN — AMLODIPINE BESYLATE 10 MG: 10 TABLET ORAL at 08:04

## 2023-10-13 RX ADMIN — ISOSORBIDE MONONITRATE 30 MG: 30 TABLET, EXTENDED RELEASE ORAL at 08:03

## 2023-10-13 RX ADMIN — HYDROCHLOROTHIAZIDE 25 MG: 25 TABLET ORAL at 08:04

## 2023-10-13 NOTE — DISCHARGE INSTR - AVS FIRST PAGE
Please remember to take all medications as directed on your medication list and follow-up with your primary care provider in 1-2 weeks. You are encouraged to keep your med list on your person (in your wallet or purse) and please take your medication list provided in this After Visit Summary to your PCP visit following discharge. Please ask your nurse to show you the medication list and explain when to take your medications. Additionally, we encourage all patient's to take their actual medications with them to their primary care visit! This is to verify you have the proper medications and the proper dosages. Remember, while medications are often listed in your computer record; that may not always be right as mistakes can occur at the pharmacy or in the computer and sometimes old medication bottles can be mistaken for newer medications. (Note to nursing: please place patient's medication list on top of the AVS.)    _____________________________________________________________________________________________    1. Elevated blood pressure noted on admission, was probably the cause of your nausea and vomiting and dizziness. Today her blood pressure is much better controlled and I have prescribed you amlodipine 10 mg to take every day with breakfast; hydrochlorothiazide 25 mg to take every day with breakfast, and Imdur 30 mg to take daily, take in the afternoon at first, if no side effects can start taking with the other 2 medications. 2.  If you begin feeling weak, lightheaded, dizzy, or you notice during routine monitoring that your blood pressure goes below 90/60; stop taking medications and discuss with your primary care physician or report to the ED for further evaluation. 3.  I have referred you to the [facility] primary care facility and [facility] for PCP follow-up. Please call the [facility] for an appointment if you do not hear from them within 1 week.     4.  Avoid salt and excess fluids; keep track of your blood pressures at least 2-3 times daily and take them with you to your primary care appointment.

## 2023-10-13 NOTE — DISCHARGE SUMMARY
Discharge Summary - Breanna Snyder 34 y.o. male MRN: [id number]    Unit/Bed#: E4 -01 Encounter: [id number]    Admission Date:   Admission Orders (From admission, onward)       Ordered        10/12/23 1603  Inpatient Admission  Once            10/12/23 3214  Place in Observation  Once                            Admitting Diagnosis: Dizziness [R42]  Hypertension [I10]    HPI: "Breanna Snyder is a 34 y.o. male who presents with c/o dizziness. Unable to obtain history due to Valium given in ED."    Procedures Performed:   Orders Placed This Encounter   Procedures    ED ECG Documentation Only    ED ECG Documentation Only       Summary of Hospital Course: Patient presented with dizziness accompanied with nausea and vomiting and general malaise. Blood pressure noted to be highly elevated on presentation and patient given Valium in the ED as well as medications to bring down his blood pressure. On exam yesterday, after admission to the [address] [facility], patient appeared improved and reported symptoms of dizziness had resolved. He was started on amlodipine 5 mg and then titrated to 10 mg daily. Also started on hydrochlorothiazide with no improvement in blood pressure overnight. This morning started on Imdur 30 mg daily in addition to the other 2 medications, blood pressures now in the 140s-150s. Patient does report mild headache, was given Tylenol and a one-time dose of ibuprofen and it appears to have improved. I have instructed patient to avoid using NSAIDs as well as sodium in his diet to avoid recurrent hypertensive urgency. Patient reports no PCP, and will refer to the [facility]. Offered to let patient stay overnight to ensure medications were effective; patient reported he would rather be discharged to spend time with family and in his own bed. No abdominal bruit noted, lipid panel obtained; spot creatinine ratio appears within normal limits.   Aldosterone and plasma renin activity labs pending; obtained as primary team initially concerned for resistant hypertension. Significant Findings, Care, Treatment and Services Provided: Treatment of hypertensive urgency    Complications: None    Discharge Diagnosis: Hypertensive urgency    Medical Problems       Resolved Problems  Date Reviewed: 10/12/2023   None         Condition at Discharge: stable         Discharge instructions/Information to patient and family:   See after visit summary for information provided to patient and family. Provisions for Follow-Up Care:  See after visit summary for information related to follow-up care and any pertinent home health orders. PCP: No primary care provider on file. Disposition: Home    Planned Readmission: No      Discharge Statement   I spent 45 minutes discharging the patient. This time was spent on the day of discharge. I had direct contact with the patient on the day of discharge. Additional documentation is required if more than 30 minutes were spent on discharge. Discharge Medications:  See after visit summary for reconciled discharge medications provided to patient and family.

## 2023-10-13 NOTE — PLAN OF CARE
Problem: DISCHARGE PLANNING  Goal: Discharge to home or other facility with appropriate resources  Description: INTERVENTIONS:  - Identify barriers to discharge w/patient and caregiver  - Arrange for needed discharge resources and transportation as appropriate  - Identify discharge learning needs (meds, wound care, etc.)  - Arrange for interpretive services to assist at discharge as needed  - Refer to Case Management Department for coordinating discharge planning if the patient needs post-hospital services based on physician/advanced practitioner order or complex needs related to functional status, cognitive ability, or social support system  10/13/2023 1447 by Oleg Mason RN  Outcome: Adequate for Discharge  10/13/2023 0927 by Oleg Mason, RN  Outcome: Progressing

## 2023-10-13 NOTE — OCCUPATIONAL THERAPY NOTE
Occupational Therapy         Patient Name: Hayden HAIRR Date: 10/13/2023       10/13/23 4272   OT Last Visit   OT Visit Date 10/13/23   Note Type   Note type Screen   Additional Comments OT consult received, chart reviewed. Per PT, pt is completely independent and at his baseline. Will D/C OT orders.      Severa Grayer

## 2023-10-13 NOTE — PLAN OF CARE
Problem: NEUROSENSORY - ADULT  Goal: Achieves stable or improved neurological status  Description: INTERVENTIONS  - Monitor and report changes in neurological status  - Monitor vital signs such as temperature, blood pressure, glucose, and any other labs ordered   - Initiate measures to prevent increased intracranial pressure  - Monitor for seizure activity and implement precautions if appropriate      Outcome: Progressing     Problem: CARDIOVASCULAR - ADULT  Goal: Maintains optimal cardiac output and hemodynamic stability  Description: INTERVENTIONS:  - Monitor I/O, vital signs and rhythm  - Monitor for S/S and trends of decreased cardiac output  - Administer and titrate ordered vasoactive medications to optimize hemodynamic stability  - Assess quality of pulses, skin color and temperature  - Assess for signs of decreased coronary artery perfusion  - Instruct patient to report change in severity of symptoms  Outcome: Progressing     Problem: SAFETY ADULT  Goal: Patient will remain free of falls  Description: INTERVENTIONS:  - Educate patient/family on patient safety including physical limitations  - Instruct patient to call for assistance with activity   - Consult OT/PT to assist with strengthening/mobility   - Keep Call bell within reach  - Keep bed low and locked with side rails adjusted as appropriate  - Keep care items and personal belongings within reach  - Initiate and maintain comfort rounds  - Make Fall Risk Sign visible to staff  - Apply yellow socks and bracelet for high fall risk patients  - Consider moving patient to room near nurses station  Outcome: Progressing  Goal: Maintain or return to baseline ADL function  Description: INTERVENTIONS:  -  Assess patient's ability to carry out ADLs; assess patient's baseline for ADL function and identify physical deficits which impact ability to perform ADLs (bathing, care of mouth/teeth, toileting, grooming, dressing, etc.)  - Assess/evaluate cause of self-care deficits   - Assess range of motion  - Assess patient's mobility; develop plan if impaired  - Assess patient's need for assistive devices and provide as appropriate  - Encourage maximum independence but intervene and supervise when necessary  - Involve family in performance of ADLs  - Assess for home care needs following discharge   - Consider OT consult to assist with ADL evaluation and planning for discharge  - Provide patient education as appropriate  Outcome: Progressing  Goal: Maintains/Returns to pre admission functional level  Description: INTERVENTIONS:  - Perform BMAT or MOVE assessment daily.   - Set and communicate daily mobility goal to care team and patient/family/caregiver. - Out of bed for toileting  - Record patient progress and toleration of activity level   Outcome: Progressing     Problem: DISCHARGE PLANNING  Goal: Discharge to home or other facility with appropriate resources  Description: INTERVENTIONS:  - Identify barriers to discharge w/patient and caregiver  - Arrange for needed discharge resources and transportation as appropriate  - Identify discharge learning needs (meds, wound care, etc.)  - Arrange for interpretive services to assist at discharge as needed  - Refer to Case Management Department for coordinating discharge planning if the patient needs post-hospital services based on physician/advanced practitioner order or complex needs related to functional status, cognitive ability, or social support system  Outcome: Progressing     Problem: Knowledge Deficit  Goal: Patient/family/caregiver demonstrates understanding of disease process, treatment plan, medications, and discharge instructions  Description: Complete learning assessment and assess knowledge base.   Interventions:  - Provide teaching at level of understanding  - Provide teaching via preferred learning methods  Outcome: Progressing

## 2023-10-13 NOTE — PLAN OF CARE
Problem: NEUROSENSORY - ADULT  Goal: Achieves stable or improved neurological status  Description: INTERVENTIONS  - Monitor and report changes in neurological status  - Monitor vital signs such as temperature, blood pressure, glucose, and any other labs ordered   - Initiate measures to prevent increased intracranial pressure  - Monitor for seizure activity and implement precautions if appropriate      Outcome: Progressing     Problem: CARDIOVASCULAR - ADULT  Goal: Maintains optimal cardiac output and hemodynamic stability  Description: INTERVENTIONS:  - Monitor I/O, vital signs and rhythm  - Monitor for S/S and trends of decreased cardiac output  - Administer and titrate ordered vasoactive medications to optimize hemodynamic stability  - Assess quality of pulses, skin color and temperature  - Assess for signs of decreased coronary artery perfusion  - Instruct patient to report change in severity of symptoms  Outcome: Progressing     Problem: SAFETY ADULT  Goal: Patient will remain free of falls  Description: INTERVENTIONS:  - Educate patient/family on patient safety including physical limitations  - Instruct patient to call for assistance with activity   - Consult OT/PT to assist with strengthening/mobility   - Keep Call bell within reach  - Keep bed low and locked with side rails adjusted as appropriate  - Keep care items and personal belongings within reach  - Initiate and maintain comfort rounds  - Make Fall Risk Sign visible to staff  - Apply yellow socks and bracelet for high fall risk patients  - Consider moving patient to room near nurses station  Outcome: Progressing  Goal: Maintain or return to baseline ADL function  Description: INTERVENTIONS:  -  Assess patient's ability to carry out ADLs; assess patient's baseline for ADL function and identify physical deficits which impact ability to perform ADLs (bathing, care of mouth/teeth, toileting, grooming, dressing, etc.)  - Assess/evaluate cause of self-care deficits   - Assess range of motion  - Assess patient's mobility; develop plan if impaired  - Assess patient's need for assistive devices and provide as appropriate  - Encourage maximum independence but intervene and supervise when necessary  - Involve family in performance of ADLs  - Assess for home care needs following discharge   - Consider OT consult to assist with ADL evaluation and planning for discharge  - Provide patient education as appropriate  Outcome: Progressing  Goal: Maintains/Returns to pre admission functional level  Description: INTERVENTIONS:  - Perform BMAT or MOVE assessment daily.   - Set and communicate daily mobility goal to care team and patient/family/caregiver. - Collaborate with rehabilitation services on mobility goals if consulted  - Record patient progress and toleration of activity level   Outcome: Progressing     Problem: DISCHARGE PLANNING  Goal: Discharge to home or other facility with appropriate resources  Description: INTERVENTIONS:  - Identify barriers to discharge w/patient and caregiver  - Arrange for needed discharge resources and transportation as appropriate  - Identify discharge learning needs (meds, wound care, etc.)  - Arrange for interpretive services to assist at discharge as needed  - Refer to Case Management Department for coordinating discharge planning if the patient needs post-hospital services based on physician/advanced practitioner order or complex needs related to functional status, cognitive ability, or social support system  Outcome: Progressing     Problem: Knowledge Deficit  Goal: Patient/family/caregiver demonstrates understanding of disease process, treatment plan, medications, and discharge instructions  Description: Complete learning assessment and assess knowledge base.   Interventions:  - Provide teaching at level of understanding  - Provide teaching via preferred learning methods  Outcome: Progressing

## 2023-10-13 NOTE — NURSING NOTE
Patient has been discharged, instructions reviewed and expressed understanding. Belongings reviewed and returned. Accompanied to elevator by RN.

## 2023-10-13 NOTE — PHYSICAL THERAPY NOTE
PHYSICAL THERAPY EVAL          Patient Name: Tian Carter  FFRBQ'K Date: 10/13/2023       10/13/23 0920   PT Last Visit   PT Visit Date 10/13/23   Note Type   Note type Evaluation   Pain Assessment   Pain Assessment Tool 0-10   Pain Score 5   Pain Location/Orientation Location: Neck   Hospital Pain Intervention(s) Emotional support   Restrictions/Precautions   Other Precautions Pain   Home Living   Type of Home House   Home Layout Two level;Bed/bath upstairs; Able to live on main level with bedroom/bathroom   Additional Comments no DME   Prior Function   Level of [place] Independent with ADLs; Independent with functional mobility; Independent with IADLS   Lives With Family  (spouse, kids)   IADLs Independent with driving; Independent with meal prep; Independent with medication management   Falls in the last 6 months 0   Vocational Full time employment   Comments indep at baseline   General   Additional Pertinent History pt admitted 10/11/23 for hypertensive urgency. up and oob orders. PMHx significant for hypertension   Family/Caregiver Present Yes   Cognition   Overall Cognitive Status WFL   Arousal/Participation Cooperative   Orientation Level Oriented X4   Memory Within functional limits   Following Commands Follows all commands and directions without difficulty   RLE Assessment   RLE Assessment WFL   LLE Assessment   LLE Assessment WFL   Transfers   Sit to Stand 7  Independent   Stand to Sit 7  Independent   Ambulation/Elevation   Gait pattern WNL   Gait Assistance 7  Independent   Assistive Device None   Distance 150'   Stair Management Assistance 7  Independent   Stair Management Technique No rails; Alternating pattern; Foreward   Number of Stairs 4   Balance   Static Standing Normal   Dynamic Standing Normal   Ambulatory Normal   Endurance Deficit   Endurance Deficit No  (vitals post amb 141/96, 112)   Activity Tolerance   Activity Tolerance Patient tolerated treatment well   Medical Staff Made Aware OT   Nurse Made Aware [place] [facility]   Assessment   Prognosis Good   Assessment George Benjamin is a [address] y.o. male admitted to [address] [place] [address] on 10/11/2023 for Hypertensive urgency. PT was consulted and pt was seen on 10/13/2023 for mobility assessment and d/c planning. Pt presents w low fall risk, acute neck pain. Reports pain as stiffness resolved w AROM. Educated pt on interventions including gentle stretching ROM (shoulder shrugs, cervical ROM), use of heat, OTC pain medications w clearance by doctor. Despite acute pain, appears to be functioning at baseline. Reports resolution of dizziness/ vertiginous sx. Defer further vestibular testing dt resolution of sx and acute neck pain. Ambulating limited community distances and negotiating steps to simulate home environment. Denies concerns regarding functional status. At this time PT recommendations for d/c are home. Can consider OPPT prn for ongoing vestibular assessment or neck pain. Barriers to Discharge None   Plan   PT Frequency   (d/c PT)   Discharge Recommendation   PT Discharge Recommendation Home with outpatient rehabilitation  (for vestibular f/u prn, ?neck pain)   AM-PAC Basic Mobility Inpatient   Turning in Flat Bed Without Bedrails 4   Lying on Back to Sitting on Edge of Flat Bed Without Bedrails 4   Moving Bed to Chair 4   Standing Up From Chair Using Arms 4   Walk in Room 4   Climb 3-5 Stairs With Railing 4   Basic Mobility Inpatient Raw Score 24   Basic Mobility Standardized Score 57.68   Highest Level Of Mobility   JH-HLM Goal 8: Walk 250 feet or more   JH-HLM Achieved 7: Walk 25 feet or more   End of Consult   Patient Position at End of Consult Bedside chair; All needs within reach     History: co - morbidities, current experience including low fall risk, pain  Exam: impairments in systems including multiple body structures involved; cardiopulmonary (vitals), am-pac  Clinical: stable/unpredictable  Complexity: moderate

## 2023-10-19 LAB
ALDOST SERPL-MCNC: 3 NG/DL (ref 0–30)
RENIN PLAS-CCNC: 0.76 NG/ML/HR (ref 0.17–5.38)

## 2023-11-22 ENCOUNTER — APPOINTMENT (OUTPATIENT)
Dept: URGENT CARE | Age: 38
End: 2023-11-22

## 2023-12-01 ENCOUNTER — OFFICE VISIT (OUTPATIENT)
Dept: FAMILY MEDICINE CLINIC | Facility: CLINIC | Age: 38
End: 2023-12-01

## 2023-12-01 VITALS
TEMPERATURE: 97.8 F | DIASTOLIC BLOOD PRESSURE: 85 MMHG | BODY MASS INDEX: 29.8 KG/M2 | RESPIRATION RATE: 18 BRPM | HEART RATE: 104 BPM | SYSTOLIC BLOOD PRESSURE: 140 MMHG | OXYGEN SATURATION: 96 % | HEIGHT: 72 IN | WEIGHT: 220 LBS

## 2023-12-01 DIAGNOSIS — I10 PRIMARY HYPERTENSION: Primary | ICD-10-CM

## 2023-12-01 PROCEDURE — 99213 OFFICE O/P EST LOW 20 MIN: CPT | Performed by: FAMILY MEDICINE

## 2023-12-01 RX ORDER — AMLODIPINE BESYLATE 10 MG/1
10 TABLET ORAL DAILY
Qty: 30 TABLET | Refills: 0 | Status: SHIPPED | OUTPATIENT
Start: 2023-12-01

## 2023-12-01 RX ORDER — CHLORTHALIDONE 50 MG/1
50 TABLET ORAL DAILY
Qty: 30 TABLET | Refills: 3 | Status: SHIPPED | OUTPATIENT
Start: 2023-12-01

## 2023-12-01 NOTE — PROGRESS NOTES
Name: Joaquim Gould      : 1985      MRN: [id number]  Encounter Provider: Swati Felix MD  Encounter Date: 2023   Encounter department: [facility]     1. Primary hypertension  Assessment & Plan:  Discovered after episode of hypertensive urgency originally dischared on HCTZ 25mg, amlodipine 10mg, and isosorbide mono ER 30mg however the isosorbide gave him headaches and he didn't use it. Today initial bp 142/70 repeat manual 140/85. On cardiac exam no tachycardia and regular rhythm. Plan for today is to simplify regimen and change to chlorthalidone 50mg and continue with amlodipine 10mg. Will f/u next week Friday and requested patient go to lab on Wednesday to complete BMP to evaluate for potassium wasting on the chlorthalidone. Encouraged continued exercise and low salt diet. Also discussed return to ED precautions including sudden blurry vision, sudden abdominal pain, bloody stools, sudden muscular weakness, and/or changes in sensation of a limb. Patient understands and agrees with the plan. On the follow-up visit would consider sending patient to perform an echocardiography to confirm functionality of heart muscle as EKG's while hospitalized indicate possible infarct; age undetermined. Orders:  -     chlorthalidone (HYGROTEN) 50 MG tablet; Take 1 tablet (50 mg total) by mouth daily  -     amLODIPine (NORVASC) 10 mg tablet; Take 1 tablet (10 mg total) by mouth daily  -     Basic metabolic panel; Future; Expected date: 2023        Depression Screening and Follow-up Plan: Patient was screened for depression during today's encounter. They screened negative with a PHQ-2 score of 0. Subjective      Joaquim Gould is a 35 yo male patient who presents after a recent ED visit and subsequent hospitalization for hypertensive urgency on 10/11/23.  Patient states that preceding the hospitalization he ate home made nachos with lots of salt. At work the next day he stoof up and got dizzy He then went home and was fine. The following day he ate more nachos, then the room started spinning. He got up, had water, layed down for two hours but "the room was still spinning" therefore he called the ambulance and his BP was above 200's when went in. Per ED note BP reads 186/103. TSH, CBC, BMP, and trops were unremarkable. Patient was then admitted for hypertensive urgency. Once on inpatient the following labs were performed and returned normal; albumin/creatinine ratio, aldosterone, and plasma renin. Patient was then discharged on hctz 25mg, amlodipine 10 mg, isosorbide mono ER 30mg all once daily Patient states he takes them all in the morning together with food however noted that the isosorbide mono nitrate gives him headaches therefore stopped taking it. However he did take it this morning and states it seems to be working. He checks his bp about three times daily and states the medications appear to get him in the systolic 515'I and without them he goes to 160's. Diastolic tends to be in the 90 and 100 range. Patient states he used to work out a lot but now with his almost three year old he hasn't done much these last three years. He started going back to the gym after this hospitalization and states his bp post work-outs he would get readings like 138/90. States he hasn't gone to the gym in a couple of weeks but will be going back soon. He thinks the salt in the nachos triggered this episode and his lack of exercise. His Mom also suffers from hypertension. States he drinks lots of water throughout the day and has noticed he is using the bathroom more frequently. States he has already lost some weight as now he is 120 and previously he was at 235 at his heaviest. No visual disturbances or changes, no lower extremity swelling. Regarding daily EKG's:  Initial EKG with normal sinus rhythm with arrhythmia otherwise normal, the following day EKG with sinus bradycardia with sinus arrhythmia and cannot rule out anterior infarct age undetermined, and then the following day EKG normal sinus rhythm with nonspecific ST and T wave abnormalities and rightward axis. Review of Systems   Eyes:  Negative for photophobia, pain and visual disturbance. Respiratory:  Negative for shortness of breath. Cardiovascular:  Negative for chest pain and palpitations. Gastrointestinal:  Negative for abdominal pain. Neurological:  Negative for dizziness, light-headedness and headaches. Current Outpatient Medications on File Prior to Visit   Medication Sig    isosorbide mononitrate (IMDUR) 30 mg 24 hr tablet Take 1 tablet (30 mg total) by mouth daily Do not start before October 14, 2023. Objective     /85 (BP Location: Left arm, Patient Position: Sitting, Cuff Size: Large)   Pulse 104   Temp 97.8 °F (36.6 °C) (Temporal)   Resp 18   Ht 6' (1.829 m)   Wt 99.8 kg (220 lb)   SpO2 96%   BMI 29.84 kg/m²     Physical Exam  Constitutional:       Appearance: Normal appearance. He is normal weight. HENT:      Head: Normocephalic and atraumatic. Right Ear: External ear normal.      Left Ear: External ear normal.      Nose: Nose normal.      Mouth/Throat:      Mouth: Mucous membranes are moist.      Pharynx: Oropharynx is clear. Eyes:      General: No scleral icterus. Right eye: No discharge. Left eye: No discharge. Extraocular Movements: Extraocular movements intact. Conjunctiva/sclera: Conjunctivae normal.   Cardiovascular:      Rate and Rhythm: Normal rate and regular rhythm. Pulses: Normal pulses. Heart sounds: Normal heart sounds. No murmur heard. No friction rub. No gallop. Pulmonary:      Effort: Pulmonary effort is normal.      Breath sounds: Normal breath sounds. No wheezing, rhonchi or rales. Musculoskeletal:         General: Normal range of motion. Cervical back: Normal range of motion. Skin:     General: Skin is warm. Capillary Refill: Capillary refill takes less than 2 seconds. Findings: No rash. Neurological:      General: No focal deficit present. Mental Status: He is alert and oriented to person, place, and time. Psychiatric:         Mood and Affect: Mood normal.         Behavior: Behavior normal.         Thought Content:  Thought content normal.         Judgment: Judgment normal.       Hai Chery MD

## 2023-12-03 PROBLEM — I10 PRIMARY HYPERTENSION: Status: ACTIVE | Noted: 2023-12-03

## 2023-12-03 NOTE — ASSESSMENT & PLAN NOTE
Discovered after episode of hypertensive urgency originally dischared on HCTZ 25mg, amlodipine 10mg, and isosorbide mono ER 30mg however the isosorbide gave him headaches and he didn't use it. Today initial bp 142/70 repeat manual 140/85. On cardiac exam no tachycardia and regular rhythm. Plan for today is to simplify regimen and change to chlorthalidone 50mg and continue with amlodipine 10mg. Will f/u next week Friday and requested patient go to lab on Wednesday to complete BMP to evaluate for potassium wasting on the chlorthalidone. Encouraged continued exercise and low salt diet. Also discussed return to ED precautions including sudden blurry vision, sudden abdominal pain, bloody stools, sudden muscular weakness, and/or changes in sensation of a limb. Patient understands and agrees with the plan. On the follow-up visit would consider sending patient to perform an echocardiography to confirm functionality of heart muscle as EKG's while hospitalized indicate possible infarct; age undetermined.

## 2024-01-13 DIAGNOSIS — I10 PRIMARY HYPERTENSION: ICD-10-CM

## 2024-01-15 RX ORDER — AMLODIPINE BESYLATE 10 MG/1
10 TABLET ORAL DAILY
Qty: 30 TABLET | Refills: 0 | Status: SHIPPED | OUTPATIENT
Start: 2024-01-15

## 2024-02-09 ENCOUNTER — OFFICE VISIT (OUTPATIENT)
Dept: FAMILY MEDICINE CLINIC | Facility: CLINIC | Age: 39
End: 2024-02-09

## 2024-02-09 VITALS
SYSTOLIC BLOOD PRESSURE: 146 MMHG | TEMPERATURE: 97.2 F | OXYGEN SATURATION: 98 % | DIASTOLIC BLOOD PRESSURE: 108 MMHG | BODY MASS INDEX: 29.66 KG/M2 | HEART RATE: 120 BPM | WEIGHT: 219 LBS | HEIGHT: 72 IN | RESPIRATION RATE: 18 BRPM

## 2024-02-09 DIAGNOSIS — R07.89 INTERMITTENT LEFT-SIDED CHEST PAIN: ICD-10-CM

## 2024-02-09 DIAGNOSIS — Z13.9 SCREENING DUE: ICD-10-CM

## 2024-02-09 DIAGNOSIS — I10 PRIMARY HYPERTENSION: Primary | ICD-10-CM

## 2024-02-09 PROCEDURE — 99213 OFFICE O/P EST LOW 20 MIN: CPT | Performed by: FAMILY MEDICINE

## 2024-02-09 RX ORDER — AMLODIPINE BESYLATE 10 MG/1
10 TABLET ORAL DAILY
Qty: 60 TABLET | Refills: 2 | Status: SHIPPED | OUTPATIENT
Start: 2024-02-09

## 2024-02-09 NOTE — PROGRESS NOTES
Name: Jake Baugh      : 1985      MRN: [id number]  Encounter Provider: Rebecca Fay Kab-Perlman, MD  Encounter Date: 2024   Encounter department: [facility]    Assessment & Plan     1. Primary hypertension  Assessment & Plan:  -previously on chlorthalidone 50 and amlodipine 10, was suppose to f/u in one month however had change in job /insurance and following up now at 2 months Has run out of amlodipine hasn't used amlodipine for the past week explaining elevated bp of 146/108, without symptoms  -performed repeat EKG: no changes from previous    PLAN  -ED precautions provided  -refilled amlodipine   -BMP to monitor electrolytes on chlorthalidone  -echo ordered due to nonspecific ST and T wave changes, likely chronic untreated HTN with cardiac changes, would like baseline  -also ordering uric acid as patient complaining of left hand pain   -f/u with me in 1 month, 2 week nurse bp check     Orders:  -     Uric acid; Future  -     ECG 12 lead; Future  -     Basic metabolic panel; Future  -     amLODIPine (NORVASC) 10 mg tablet; Take 1 tablet (10 mg total) by mouth daily  -     Echo complete w/ contrast if indicated; Future; Expected date: 2024    2. Intermittent left-sided chest pain  Assessment & Plan:  -lasts seconds then resolves    PLAN  -please see HTN A/P ordering echo    Orders:  -     Echo complete w/ contrast if indicated; Future; Expected date: 2024    3. Screening due  -     Hepatitis C antibody; Future  -     HIV 1/2 AG/AB w Reflex [facility] for 2 yr old and above; Future           Subjective      Jake Baugh is a 37 yo male patient presenting today to f/u regarding blood pressure. Current medications include chlorthalidone 50mg and amlodipine 10mg. Previously missed bp f/u appointment. Ran out of amlodipine for the past 2 weeks. Continues to take chlorthalidone 50mg Home bp readings 140 for systolic and 100 for diastolic.     In between jobs in  January had insurance lapse, now working with equipment repair and maintenance.     He does state he has intermittent chest pain rarely and it lasts for several seconda then goes away.     Review of Systems   Constitutional:  Negative for fever.   Eyes:  Negative for photophobia, redness and visual disturbance.   Cardiovascular:  Positive for chest pain. Negative for palpitations.   Neurological:  Positive for headaches. Negative for dizziness, facial asymmetry, weakness and light-headedness.       Current Outpatient Medications on File Prior to Visit   Medication Sig    chlorthalidone (HYGROTEN) 50 MG tablet Take 1 tablet (50 mg total) by mouth daily    isosorbide mononitrate (IMDUR) 30 mg 24 hr tablet Take 1 tablet (30 mg total) by mouth daily Do not start before October 14, 2023.       Objective     BP (!) 146/108 (BP Location: Right arm, Patient Position: Sitting, Cuff Size: Standard)   Pulse (!) 120   Temp (!) 97.2 °F (36.2 °C) (Temporal)   Resp 18   Ht 6' (1.829 m)   Wt 99.3 kg (219 lb)   SpO2 98%   BMI 29.70 kg/m²     Physical Exam  Constitutional:       Appearance: Normal appearance. He is normal weight.   HENT:      Head: Normocephalic and atraumatic.      Right Ear: External ear normal.      Left Ear: External ear normal.      Nose: Nose normal.      Mouth/Throat:      Mouth: Mucous membranes are moist.      Pharynx: Oropharynx is clear.   Eyes:      General: No scleral icterus.        Right eye: No discharge.         Left eye: No discharge.      Extraocular Movements: Extraocular movements intact.      Conjunctiva/sclera: Conjunctivae normal.   Cardiovascular:      Rate and Rhythm: Normal rate and regular rhythm.      Pulses: Normal pulses.      Heart sounds: Normal heart sounds. No murmur heard.     No friction rub. No gallop.   Pulmonary:      Effort: Pulmonary effort is normal.      Breath sounds: Normal breath sounds. No wheezing, rhonchi or rales.   Abdominal:      General: Abdomen is flat.  Bowel sounds are normal. There is no distension.      Palpations: Abdomen is soft.      Tenderness: There is no abdominal tenderness.   Musculoskeletal:         General: Normal range of motion.      Cervical back: Normal range of motion.   Skin:     General: Skin is warm.      Capillary Refill: Capillary refill takes less than 2 seconds.      Findings: No rash.   Neurological:      General: No focal deficit present.      Mental Status: He is alert and oriented to person, place, and time.   Psychiatric:         Mood and Affect: Mood normal.         Behavior: Behavior normal.         Thought Content: Thought content normal.       Rebecca Fay Kab-Perlman, MD

## 2024-02-11 PROBLEM — R07.89 INTERMITTENT LEFT-SIDED CHEST PAIN: Status: ACTIVE | Noted: 2024-02-11

## 2024-02-11 NOTE — ASSESSMENT & PLAN NOTE
-previously on chlorthalidone 50 and amlodipine 10, was suppose to f/u in one month however had change in job /insurance and following up now at 2 months Has run out of amlodipine hasn't used amlodipine for the past week explaining elevated bp of 146/108, without symptoms  -performed repeat EKG: no changes from previous    PLAN  -ED precautions provided  -refilled amlodipine   -BMP to monitor electrolytes on chlorthalidone  -echo ordered due to nonspecific ST and T wave changes, likely chronic untreated HTN with cardiac changes, would like baseline  -also ordering uric acid as patient complaining of left hand pain   -f/u with me in 1 month, 2 week nurse bp check

## 2024-02-12 ENCOUNTER — APPOINTMENT (OUTPATIENT)
Dept: LAB | Facility: HOSPITAL | Age: 39
End: 2024-02-12
Payer: COMMERCIAL

## 2024-02-12 DIAGNOSIS — I10 PRIMARY HYPERTENSION: ICD-10-CM

## 2024-02-12 DIAGNOSIS — Z13.9 SCREENING DUE: ICD-10-CM

## 2024-02-12 LAB
ANION GAP SERPL CALCULATED.3IONS-SCNC: 11 MMOL/L
BUN SERPL-MCNC: 20 MG/DL (ref 5–25)
CALCIUM SERPL-MCNC: 9.9 MG/DL (ref 8.4–10.2)
CHLORIDE SERPL-SCNC: 96 MMOL/L (ref 96–108)
CO2 SERPL-SCNC: 30 MMOL/L (ref 21–32)
CREAT SERPL-MCNC: 1.15 MG/DL (ref 0.6–1.3)
GFR SERPL CREATININE-BSD FRML MDRD: 79 ML/MIN/1.73SQ M
GLUCOSE SERPL-MCNC: 154 MG/DL (ref 65–140)
HCV AB SER QL: NORMAL
HIV 1+2 AB+HIV1 P24 AG SERPL QL IA: NORMAL
HIV 2 AB SERPL QL IA: NORMAL
HIV1 AB SERPL QL IA: NORMAL
HIV1 P24 AG SERPL QL IA: NORMAL
POTASSIUM SERPL-SCNC: 2.9 MMOL/L (ref 3.5–5.3)
SODIUM SERPL-SCNC: 137 MMOL/L (ref 135–147)
URATE SERPL-MCNC: 8.4 MG/DL (ref 3.5–8.5)

## 2024-02-12 PROCEDURE — 87389 HIV-1 AG W/HIV-1&-2 AB AG IA: CPT

## 2024-02-12 PROCEDURE — 36415 COLL VENOUS BLD VENIPUNCTURE: CPT

## 2024-02-12 PROCEDURE — 86803 HEPATITIS C AB TEST: CPT

## 2024-02-12 PROCEDURE — 84550 ASSAY OF BLOOD/URIC ACID: CPT

## 2024-02-12 PROCEDURE — 80048 BASIC METABOLIC PNL TOTAL CA: CPT

## 2024-02-13 DIAGNOSIS — R73.9 HIGH BLOOD SUGAR: ICD-10-CM

## 2024-02-13 DIAGNOSIS — E87.6 HYPOKALEMIA: Primary | ICD-10-CM

## 2024-02-13 DIAGNOSIS — I10 PRIMARY HYPERTENSION: ICD-10-CM

## 2024-02-13 RX ORDER — LOSARTAN POTASSIUM 50 MG/1
50 TABLET ORAL DAILY
Qty: 30 TABLET | Refills: 0 | Status: SHIPPED | OUTPATIENT
Start: 2024-02-13

## 2024-02-13 RX ORDER — POTASSIUM CHLORIDE 750 MG/1
10 TABLET, EXTENDED RELEASE ORAL DAILY
Qty: 2 TABLET | Refills: 0 | Status: SHIPPED | OUTPATIENT
Start: 2024-02-13

## 2024-02-13 NOTE — PROGRESS NOTES
Spoke with patient about plan considering potassium of 2.9. Will stop chlorthalidone. Today will take at home potassium gluconate (90mg potassium per patient) because unable to go to pharmacy today due to weather. Then Wednesday and Thursday will take 10mEq kdur, Friday will go to lab and perform CMP and HbA1c. Will reach out over weekend regarding potassium. Will also start cozaar 50mg tomorrow, instructed to take at different times than the amlodipine. Patient agrees with plan and expresses understanding.    Paternal Grandmother with diabetes

## 2024-02-21 ENCOUNTER — TELEPHONE (OUTPATIENT)
Dept: FAMILY MEDICINE CLINIC | Facility: CLINIC | Age: 39
End: 2024-02-21

## 2024-02-22 NOTE — TELEPHONE ENCOUNTER
Left message informing patient that his labs were not completed. Explained risks of hypokalemia including life threatening cardiac arhythmia, encouraged completion of labs and expressed concern.

## 2024-03-01 ENCOUNTER — RA CDI HCC (OUTPATIENT)
Dept: OTHER | Facility: HOSPITAL | Age: 39
End: 2024-03-01

## 2024-03-08 ENCOUNTER — OFFICE VISIT (OUTPATIENT)
Dept: FAMILY MEDICINE CLINIC | Facility: CLINIC | Age: 39
End: 2024-03-08

## 2024-03-08 DIAGNOSIS — I10 PRIMARY HYPERTENSION: Primary | ICD-10-CM

## 2024-03-08 PROCEDURE — 99213 OFFICE O/P EST LOW 20 MIN: CPT | Performed by: FAMILY MEDICINE

## 2024-03-08 NOTE — PROGRESS NOTES
Name: Jake Baugh      : 1985      MRN: [id number]  Encounter Provider: Rebecca Fay Kab-Perlman, MD  Encounter Date: 3/8/2024   Encounter department: [facility]    Assessment & Plan     1. Primary hypertension  Assessment & Plan:  -home meds of losartan 50mg and amlodipine 10mg  -using at home self-prescribed potassium gluconate  -hx of hypokalemia on chlorthalidone    PLAN  -stop potassium gluconate and next week Friday perform BMP   -greatly encouraged to maintain bp log same time, sitting, left arm, without distraction  -congratulated on gym and salt decrease and encouragement to continue with more gym time and less salt  -f/u one month bring bp log at that time   -ED precautions reviewed              Subjective      Jake Baugh is a 39 yo male patient presenting today to f/u regarding hypertension. Previously had hypokalemia with chlorthalidone therefore switched to losartan. We were to f/u with a BMP and patient states he wasn't able to due to his schedule. Current regimen includes losartan 50mg and amlodipine 10mg. Patient states he checks bp at home at least once daily high of 160 but usually around 140's/90's. Takes potassium gluconate and takes one to two a day. Believes that the potassium gluconate helps with cramping in hands as that is what his father told him. Previously instructed to take one hypertensive medication in the morning and one at night. Today states that sometimes takes both meds at night as forgets to take in the morning; this happens about twice a week. States with the improved blood pressure control gets headaches less often.     He states he goes to the gym about once a week.   Trying to cut back on salt however it's difficult for him.       Review of Systems   Constitutional:  Negative for fatigue and fever.   Respiratory:  Negative for shortness of breath.    Cardiovascular:  Negative for chest pain and palpitations.   Gastrointestinal:   Negative for abdominal pain.   Neurological:  Negative for dizziness, light-headedness and headaches.   Hematological:  Does not bruise/bleed easily.       Current Outpatient Medications on File Prior to Visit   Medication Sig    amLODIPine (NORVASC) 10 mg tablet Take 1 tablet (10 mg total) by mouth daily    losartan (COZAAR) 50 mg tablet Take 1 tablet (50 mg total) by mouth daily    chlorthalidone (HYGROTEN) 50 MG tablet Take 1 tablet (50 mg total) by mouth daily    potassium chloride (Klor-Con M10) 10 mEq tablet Take 1 tablet (10 mEq total) by mouth daily       Objective     /92 (BP Location: Left arm, Patient Position: Sitting, Cuff Size: Adult)   Pulse 65   Temp (!) 96.9 °F (36.1 °C) (Temporal)   Resp 18   Ht 6' (1.829 m)   Wt 103 kg (227 lb)   SpO2 97%   BMI 30.79 kg/m²     Physical Exam  Constitutional:       Appearance: Normal appearance.   HENT:      Head: Normocephalic and atraumatic.      Right Ear: External ear normal.      Left Ear: External ear normal.      Nose: Nose normal.      Mouth/Throat:      Mouth: Mucous membranes are moist.      Pharynx: Oropharynx is clear.   Eyes:      General: No scleral icterus.        Right eye: No discharge.         Left eye: No discharge.      Extraocular Movements: Extraocular movements intact.      Conjunctiva/sclera: Conjunctivae normal.   Cardiovascular:      Rate and Rhythm: Normal rate and regular rhythm.      Pulses: Normal pulses.      Heart sounds: Normal heart sounds. No murmur heard.     No friction rub. No gallop.   Pulmonary:      Effort: Pulmonary effort is normal.      Breath sounds: Normal breath sounds. No wheezing, rhonchi or rales.   Abdominal:      General: Abdomen is flat. Bowel sounds are normal. There is no distension.      Palpations: Abdomen is soft.      Tenderness: There is no abdominal tenderness.   Musculoskeletal:         General: Normal range of motion.      Cervical back: Normal range of motion and neck supple.      Right  lower leg: No edema.      Left lower leg: No edema.   Skin:     General: Skin is warm.      Capillary Refill: Capillary refill takes less than 2 seconds.      Findings: No rash.   Neurological:      General: No focal deficit present.      Mental Status: He is alert and oriented to person, place, and time.   Psychiatric:         Mood and Affect: Mood normal.         Behavior: Behavior normal.         Thought Content: Thought content normal.         Judgment: Judgment normal.       Rebecca Fay Kab-Perlman, MD

## 2024-03-10 VITALS
TEMPERATURE: 96.9 F | HEART RATE: 65 BPM | RESPIRATION RATE: 18 BRPM | WEIGHT: 227 LBS | SYSTOLIC BLOOD PRESSURE: 138 MMHG | HEIGHT: 72 IN | BODY MASS INDEX: 30.75 KG/M2 | DIASTOLIC BLOOD PRESSURE: 92 MMHG | OXYGEN SATURATION: 97 %

## 2024-03-10 NOTE — ASSESSMENT & PLAN NOTE
-home meds of losartan 50mg and amlodipine 10mg  -using at home self-prescribed potassium gluconate  -hx of hypokalemia on chlorthalidone    PLAN  -stop potassium gluconate and next week Friday perform BMP   -greatly encouraged to maintain bp log same time, sitting, left arm, without distraction  -congratulated on gym and salt decrease and encouragement to continue with more gym time and less salt  -f/u one month bring bp log at that time   -ED precautions reviewed

## 2024-03-27 ENCOUNTER — OFFICE VISIT (OUTPATIENT)
Dept: FAMILY MEDICINE CLINIC | Facility: CLINIC | Age: 39
End: 2024-03-27

## 2024-03-27 VITALS
WEIGHT: 224 LBS | DIASTOLIC BLOOD PRESSURE: 108 MMHG | HEIGHT: 72 IN | OXYGEN SATURATION: 97 % | RESPIRATION RATE: 16 BRPM | SYSTOLIC BLOOD PRESSURE: 170 MMHG | HEART RATE: 85 BPM | TEMPERATURE: 97.9 F | BODY MASS INDEX: 30.34 KG/M2

## 2024-03-27 DIAGNOSIS — R68.89 FLU-LIKE SYMPTOMS: Primary | ICD-10-CM

## 2024-03-27 DIAGNOSIS — I10 PRIMARY HYPERTENSION: ICD-10-CM

## 2024-03-27 PROBLEM — I16.0 HYPERTENSIVE URGENCY: Status: RESOLVED | Noted: 2023-10-12 | Resolved: 2024-03-27

## 2024-03-27 PROCEDURE — 87636 SARSCOV2 & INF A&B AMP PRB: CPT | Performed by: FAMILY MEDICINE

## 2024-03-27 PROCEDURE — 99213 OFFICE O/P EST LOW 20 MIN: CPT | Performed by: FAMILY MEDICINE

## 2024-03-27 RX ORDER — LOSARTAN POTASSIUM 50 MG/1
50 TABLET ORAL DAILY
Qty: 90 TABLET | Refills: 1 | Status: SHIPPED | OUTPATIENT
Start: 2024-03-27

## 2024-03-27 RX ORDER — AMLODIPINE BESYLATE 10 MG/1
10 TABLET ORAL DAILY
Qty: 90 TABLET | Refills: 1 | Status: SHIPPED | OUTPATIENT
Start: 2024-03-27

## 2024-03-27 NOTE — ASSESSMENT & PLAN NOTE
Pt off meds for a while. List updated. Reminded to  amlodipine and hold losartan if fevers are high.

## 2024-03-27 NOTE — ASSESSMENT & PLAN NOTE
Work note written.   No signs of bacterial illness.   Out of window for rx for covid and flu.   Test for flu covid for diagnostic purposes.   Return and er precautions given.   Declines need for symptomatic mgmt.

## 2024-03-27 NOTE — PROGRESS NOTES
Assessment/Plan:    Flu-like symptoms  Work note written.   No signs of bacterial illness.   Out of window for rx for covid and flu.   Test for flu covid for diagnostic purposes.   Return and er precautions given.   Declines need for symptomatic mgmt.       Primary hypertension  Pt off meds for a while. List updated. Reminded to  amlodipine and hold losartan if fevers are high.        Diagnoses and all orders for this visit:    Flu-like symptoms  -     Covid/Flu- Office Collect    Primary hypertension  -     amLODIPine (NORVASC) 10 mg tablet; Take 1 tablet (10 mg total) by mouth daily  -     losartan (COZAAR) 50 mg tablet; Take 1 tablet (50 mg total) by mouth daily          Subjective: flu like symptoms     Patient ID: Jake Baugh is a 38 y.o. male with a ho HTN.     HPI  Here for cough and congestion. Symptoms started 6 days ago. He started to have fevers about 3 days with a high of 102.0. The last fever was last night. Currently having chills, body aches, productive cough, runny nose, on and off sore throat. Denies headache, rash, vision changes, burning on urination, diarrhea. Sick contact at work. No flu shot. He has not checked himself for covid. He is only using tylenol which helps the fever. Symptoms are staying the same. He was last at work on Thursday.        The following portions of the patient's history were reviewed and updated as appropriate: allergies, current medications, past family history, past medical history, past social history, past surgical history, and problem list.    Review of Systems   Constitutional:  Positive for chills, fatigue and fever. Negative for activity change, appetite change and unexpected weight change.   HENT:  Positive for congestion, rhinorrhea and sore throat. Negative for ear pain and postnasal drip.    Eyes:  Negative for photophobia and pain.   Respiratory:  Positive for cough. Negative for shortness of breath and wheezing.    Cardiovascular:  Negative for  chest pain, palpitations and leg swelling.   Gastrointestinal:  Negative for abdominal pain, blood in stool, nausea and vomiting.   Endocrine: Negative for polydipsia and polyuria.   Genitourinary:  Negative for difficulty urinating, hematuria and urgency.   Musculoskeletal:  Positive for myalgias.   Skin:  Negative for rash.   Neurological:  Negative for dizziness.   Psychiatric/Behavioral:  Negative for confusion and sleep disturbance.          PHQ-2/9 Depression Screening             Objective:      BP (!) 170/108 (BP Location: Left arm, Patient Position: Sitting, Cuff Size: Large)   Pulse 85   Temp 97.9 °F (36.6 °C) (Temporal)   Resp 16   Ht 6' (1.829 m)   Wt 102 kg (224 lb)   SpO2 97%   BMI 30.38 kg/m²          Physical Exam  Constitutional:       Appearance: He is well-developed.   HENT:      Head: Normocephalic and atraumatic.      Right Ear: Tympanic membrane and external ear normal.      Left Ear: Tympanic membrane and external ear normal.      Ears:      Comments: Left ear mild erythema     Mouth/Throat:      Pharynx: No oropharyngeal exudate.   Eyes:      Conjunctiva/sclera: Conjunctivae normal.      Pupils: Pupils are equal, round, and reactive to light.   Cardiovascular:      Rate and Rhythm: Normal rate and regular rhythm.      Heart sounds: Normal heart sounds. No murmur heard.     No friction rub. No gallop.   Pulmonary:      Effort: Pulmonary effort is normal. No respiratory distress.      Breath sounds: Normal breath sounds. No wheezing or rales.   Chest:      Chest wall: No tenderness.   Abdominal:      General: Bowel sounds are normal. There is no distension.      Palpations: Abdomen is soft. There is no mass.      Tenderness: There is no abdominal tenderness. There is no rebound.   Musculoskeletal:         General: Normal range of motion.      Cervical back: Normal range of motion and neck supple.   Skin:     General: Skin is warm and dry.   Neurological:      Mental Status: He is alert  and oriented to person, place, and time.

## 2024-03-27 NOTE — LETTER
March 27, 2024     Patient: Jake Baugh  YOB: 1985  Date of Visit: 3/27/2024      To Whom it May Concern:    Jake Baugh is under my professional care. Jake was seen in my office on 3/27/2024. Jake may return to work on 3/29/23 if he is fever free for at least 24 hrs. Please excuse from work starting 3/21/23 .    If you have any questions or concerns, please don't hesitate to call.         Sincerely,          Gale Alvarenag MD        CC: No Recipients

## 2024-03-28 LAB
FLUAV RNA RESP QL NAA+PROBE: NEGATIVE
FLUBV RNA RESP QL NAA+PROBE: NEGATIVE
SARS-COV-2 RNA RESP QL NAA+PROBE: NEGATIVE

## 2025-02-24 ENCOUNTER — OFFICE VISIT (OUTPATIENT)
Dept: FAMILY MEDICINE CLINIC | Facility: CLINIC | Age: 40
End: 2025-02-24

## 2025-02-24 VITALS
SYSTOLIC BLOOD PRESSURE: 140 MMHG | WEIGHT: 213.2 LBS | BODY MASS INDEX: 28.88 KG/M2 | HEIGHT: 72 IN | HEART RATE: 110 BPM | RESPIRATION RATE: 14 BRPM | DIASTOLIC BLOOD PRESSURE: 100 MMHG | OXYGEN SATURATION: 97 % | TEMPERATURE: 96.8 F

## 2025-02-24 DIAGNOSIS — I10 PRIMARY HYPERTENSION: Primary | ICD-10-CM

## 2025-02-24 PROBLEM — R42 DIZZINESS: Status: RESOLVED | Noted: 2023-10-12 | Resolved: 2025-02-24

## 2025-02-24 PROBLEM — R68.89 FLU-LIKE SYMPTOMS: Status: RESOLVED | Noted: 2024-03-27 | Resolved: 2025-02-24

## 2025-02-24 PROCEDURE — 99213 OFFICE O/P EST LOW 20 MIN: CPT

## 2025-02-24 RX ORDER — CHLORTHALIDONE 25 MG/1
25 TABLET ORAL DAILY
Qty: 30 TABLET | Refills: 5 | Status: SHIPPED | OUTPATIENT
Start: 2025-02-24

## 2025-02-24 NOTE — ASSESSMENT & PLAN NOTE
Wt Readings from Last 3 Encounters:   02/24/25 96.7 kg (213 lb 3.2 oz)   03/27/24 102 kg (224 lb)   03/08/24 103 kg (227 lb)     -Going to gym regularly, has made dietary changes including no beer and decreased salt intake  -Lost >10 lbs in last year  -Home bp readings 140-150/100-115 with good technique  -bp in office 140/80, repeat manual 140/100  -Home medication of amlodipine 10mg and losartan 50mg, hasn't been taking his bp meds because didn't seem to be working  -Of  descent  -Was at office earlier today for pre-employment evaluation and physical not performed due to elvataed BP's please look at HPI    PLAN  -start chlorthalidone 25mg  -maintain home BP log  -f/u one month  -Note written that his hypertension does not preclude him from the physical evaluation for pre-employment     Orders:    Lipid Panel with Direct LDL reflex; Future    chlorthalidone 25 mg tablet; Take 1 tablet (25 mg total) by mouth daily

## 2025-02-24 NOTE — PROGRESS NOTES
Name: Jake Baugh      : 1985      MRN: [id number]  Encounter Provider: Rebecca Fay Kab-Perlman, MD  Encounter Date: 2025   Encounter department: [facility]  :  Assessment & Plan  Primary hypertension  Wt Readings from Last 3 Encounters:   25 96.7 kg (213 lb 3.2 oz)   24 102 kg (224 lb)   24 103 kg (227 lb)     -Going to gym regularly, has made dietary changes including no beer and decreased salt intake  -Lost >10 lbs in last year  -Home bp readings 140-150/100-115 with good technique  -bp in office 140/80, repeat manual 140/100  -Home medication of amlodipine 10mg and losartan 50mg, hasn't been taking his bp meds because didn't seem to be working  -Of  descent  -Was at office earlier today for pre-employment evaluation and physical not performed due to elvataed BP's please look at HPI    PLAN  -start chlorthalidone 25mg  -maintain home BP log  -f/u one month  -Note written that his hypertension does not preclude him from the physical evaluation for pre-employment     Orders:    Lipid Panel with Direct LDL reflex; Future    chlorthalidone 25 mg tablet; Take 1 tablet (25 mg total) by mouth daily           History of Present Illness     Jake Baugh is a 40 yo male patient presenting today for this same day visit regarding his hypertension. Went to [facility] earlier today to do physical for new job however his BP was too high and they sent him here. They checked his BP twice. The first time 174/110, then it went to 193/94.     Review of Systems   Constitutional:  Negative for chills, diaphoresis and fever.   Eyes:  Negative for photophobia and visual disturbance.   Respiratory:  Negative for shortness of breath.    Cardiovascular:  Negative for chest pain and palpitations.   Gastrointestinal:  Negative for abdominal pain.   Skin:  Negative for rash.   Hematological:  Negative for adenopathy. Does not bruise/bleed easily.       Objective   BP  140/100 (BP Location: Left arm, Patient Position: Sitting, Cuff Size: Adult)   Pulse (!) 110   Temp (!) 96.8 °F (36 °C) (Temporal)   Resp 14   Ht 6' (1.829 m)   Wt 96.7 kg (213 lb 3.2 oz)   SpO2 97%   BMI 28.92 kg/m²      Physical Exam  Constitutional:       Appearance: Normal appearance.   HENT:      Head: Normocephalic and atraumatic.   Eyes:      Conjunctiva/sclera: Conjunctivae normal.   Cardiovascular:      Rate and Rhythm: Normal rate and regular rhythm.      Heart sounds: Normal heart sounds. No murmur heard.     No friction rub. No gallop.   Pulmonary:      Effort: Pulmonary effort is normal.      Breath sounds: Normal breath sounds. No wheezing, rhonchi or rales.   Musculoskeletal:         General: Normal range of motion.   Skin:     General: Skin is warm.   Neurological:      General: No focal deficit present.      Mental Status: He is alert and oriented to person, place, and time.   Psychiatric:         Mood and Affect: Mood normal.         Behavior: Behavior normal.         Thought Content: Thought content normal.         Judgment: Judgment normal.

## 2025-02-24 NOTE — LETTER
February 24, 2025     Patient: Jake Buagh  YOB: 1985  Date of Visit: 2/24/2025      To Whom it May Concern:    Jake Baugh is under my professional care. Jake was seen in my office on 2/24/2025. Jake had blood pressure values of 140/80 and subsequently 140/100 at today's visit. We have started him on chlorthalidone 25mg and will follow-up in one month. The hypertension does not preclude him from participation in the human performance evaluation.     If you have any questions or concerns, please don't hesitate to call.         Sincerely,          Rebecca Fay Kab-Perlman, MD

## 2025-02-24 NOTE — LETTER
February 24, 2025     Patient: Jake Baugh  YOB: 1985  Date of Visit: 2/24/2025      To Whom it May Concern:    Jake Baugh is under my professional care. Jake was seen in my office on 2/24/2025. Jake had blood pressure values of 140/80 and subsequently 140/100 at today's visit. We have started him on chlorthalidone 25mg and will follow-up in one month. The hypertension does not preclude him from participation in the human performance evaluation. .    If you have any questions or concerns, please don't hesitate to call.         Sincerely,          Rebecca Fay Kab-Perlman, MD

## 2025-03-20 ENCOUNTER — RA CDI HCC (OUTPATIENT)
Dept: OTHER | Facility: HOSPITAL | Age: 40
End: 2025-03-20

## 2025-04-03 ENCOUNTER — OFFICE VISIT (OUTPATIENT)
Dept: FAMILY MEDICINE CLINIC | Facility: CLINIC | Age: 40
End: 2025-04-03

## 2025-04-03 VITALS
DIASTOLIC BLOOD PRESSURE: 90 MMHG | HEART RATE: 87 BPM | SYSTOLIC BLOOD PRESSURE: 140 MMHG | TEMPERATURE: 97.3 F | WEIGHT: 217.6 LBS | RESPIRATION RATE: 16 BRPM | OXYGEN SATURATION: 95 % | HEIGHT: 72 IN | BODY MASS INDEX: 29.47 KG/M2

## 2025-04-03 DIAGNOSIS — I10 PRIMARY HYPERTENSION: Primary | ICD-10-CM

## 2025-04-03 PROCEDURE — 99213 OFFICE O/P EST LOW 20 MIN: CPT | Performed by: INTERNAL MEDICINE

## 2025-04-03 NOTE — ASSESSMENT & PLAN NOTE
Wt Readings from Last 3 Encounters:   04/03/25 98.7 kg (217 lb 9.6 oz)   02/24/25 96.7 kg (213 lb 3.2 oz)   03/27/24 102 kg (224 lb)     -Going to gym regularly, has made dietary changes including no beer and decreased salt intake including decreased caffeine (several times weekly and not daily)   -Home bp readings 160-180/ with good technique  -bp in office 140/90  -Home medication of chlorthalidone 25mg. Previously on amlodipine 10mg and losartan 50mg  -Of African descent    PLAN  -Continue chlorthalidone 25mg and add amlodipine 10mg to be used at night (chlorthalidone in AM)  -Discussed blood pressure goal of less than 140/90 and that less than 90/60 is hypotension  -Explained that it may take about a week to see the results from the amlodipine  -Continue to maintain home BP log  -Has amlodipine at home from previously does not need new prescription/refill  -f/u three weeks  -Counseled again of ED precautions including severe headaches, focal deficits, unresolving blurriness or floaters

## 2025-04-03 NOTE — PROGRESS NOTES
Name: Jake Baugh      : 1985      MRN: [id number]  Encounter Provider: Rebecca Fay Kab-Perlman, MD  Encounter Date: 4/3/2025   Encounter department: [facility]  :  Assessment & Plan  Primary hypertension  Wt Readings from Last 3 Encounters:   25 98.7 kg (217 lb 9.6 oz)   25 96.7 kg (213 lb 3.2 oz)   24 102 kg (224 lb)     -Going to gym regularly, has made dietary changes including no beer and decreased salt intake including decreased caffeine (several times weekly and not daily)   -Home bp readings 160-180/ with good technique  -bp in office 140/90  -Home medication of chlorthalidone 25mg. Previously on amlodipine 10mg and losartan 50mg  -Of African descent    PLAN  -Continue chlorthalidone 25mg and add amlodipine 10mg to be used at night (chlorthalidone in AM)  -Discussed blood pressure goal of less than 140/90 and that less than 90/60 is hypotension  -Explained that it may take about a week to see the results from the amlodipine  -Continue to maintain home BP log  -Has amlodipine at home from previously does not need new prescription/refill  -f/u three weeks  -Counseled again of ED precautions including severe headaches, focal deficits, unresolving blurriness or floaters               BMI Counseling: Body mass index is 29.51 kg/m². The BMI is above normal. Nutrition recommendations include decreasing portion sizes, encouraging healthy choices of fruits and vegetables, consuming healthier snacks, limiting drinks that contain sugar, moderation in carbohydrate intake, increasing intake of lean protein and reducing intake of saturated and trans fat. Exercise recommendations include moderate physical activity 150 minutes/week. No pharmacotherapy was ordered. Patient referred to PCP. Rationale for BMI follow-up plan is due to patient being overweight or obese.       History of Present Illness     Jake Baugh is a 40 yo male presenting today to f/u  regarding his hypertension. He states his blood pressure readings have been between 160-180/. He takes his bp in the evening, left arm. He does it after work. Takes chlorthalidone in the morning. Has been cutting back on coffee and salt. In the last 2/3 weeks had about one can of coffee. Previously when on losartan and amlodipine states he had better control. Continues to go to the gym. At work he is  calculating steps and walks about 6 miles on steps. 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     States sometimes wakes up in the morning and has headaches OR vision blurriness that is intermittent. He thinks it's related to what he eats.      Review of Systems   Constitutional:  Negative for fever.   Eyes:  Negative for photophobia, pain, redness and visual disturbance.   Respiratory:  Negative for shortness of breath.    Cardiovascular:  Negative for chest pain, palpitations and leg swelling.   Skin:  Negative for rash.   Neurological:  Negative for dizziness, weakness, light-headedness and headaches.   Hematological:  Negative for adenopathy. Does not bruise/bleed easily.   Psychiatric/Behavioral:  Negative for confusion.        Objective   /90 (BP Location: Right arm, Patient Position: Sitting, Cuff Size: Standard)   Pulse 87   Temp (!) 97.3 °F (36.3 °C) (Temporal)   Resp 16   Ht 6' (1.829 m)   Wt 98.7 kg (217 lb 9.6 oz)   SpO2 95%   BMI 29.51 kg/m²      Physical Exam  Constitutional:       Appearance: Normal appearance.   HENT:      Head: Normocephalic and atraumatic.   Eyes:      Extraocular Movements: Extraocular movements intact.      Conjunctiva/sclera: Conjunctivae normal.   Cardiovascular:      Rate and Rhythm: Normal rate and regular rhythm.      Heart sounds: Normal heart sounds. No murmur heard.     No friction rub. No gallop.   Pulmonary:      Effort: Pulmonary effort is normal.      Breath sounds: Normal breath sounds. No wheezing, rhonchi  or rales.   Musculoskeletal:         General: Normal range of motion.      Cervical back: Normal range of motion.   Neurological:      General: No focal deficit present.      Mental Status: He is alert and oriented to person, place, and time. Mental status is at baseline.   Psychiatric:         Mood and Affect: Mood normal.         Behavior: Behavior normal.

## 2025-06-11 ENCOUNTER — OFFICE VISIT (OUTPATIENT)
Dept: FAMILY MEDICINE CLINIC | Facility: CLINIC | Age: 40
End: 2025-06-11

## 2025-06-11 ENCOUNTER — APPOINTMENT (OUTPATIENT)
Dept: LAB | Facility: CLINIC | Age: 40
End: 2025-06-11
Payer: COMMERCIAL

## 2025-06-11 VITALS
WEIGHT: 212 LBS | TEMPERATURE: 98.4 F | DIASTOLIC BLOOD PRESSURE: 90 MMHG | RESPIRATION RATE: 18 BRPM | HEIGHT: 72 IN | BODY MASS INDEX: 28.71 KG/M2 | SYSTOLIC BLOOD PRESSURE: 138 MMHG | OXYGEN SATURATION: 98 % | HEART RATE: 74 BPM

## 2025-06-11 DIAGNOSIS — Z13.220 SCREENING, LIPID: ICD-10-CM

## 2025-06-11 DIAGNOSIS — E87.6 HYPOKALEMIA: ICD-10-CM

## 2025-06-11 DIAGNOSIS — I10 PRIMARY HYPERTENSION: Primary | ICD-10-CM

## 2025-06-11 DIAGNOSIS — Z13.1 SCREENING FOR DIABETES MELLITUS (DM): ICD-10-CM

## 2025-06-11 DIAGNOSIS — I10 PRIMARY HYPERTENSION: ICD-10-CM

## 2025-06-11 LAB
ANION GAP SERPL CALCULATED.3IONS-SCNC: 9 MMOL/L (ref 4–13)
BUN SERPL-MCNC: 15 MG/DL (ref 5–25)
CALCIUM SERPL-MCNC: 9.8 MG/DL (ref 8.4–10.2)
CHLORIDE SERPL-SCNC: 101 MMOL/L (ref 96–108)
CHOLEST SERPL-MCNC: 227 MG/DL (ref ?–200)
CO2 SERPL-SCNC: 29 MMOL/L (ref 21–32)
CREAT SERPL-MCNC: 0.88 MG/DL (ref 0.6–1.3)
EST. AVERAGE GLUCOSE BLD GHB EST-MCNC: 126 MG/DL
GFR SERPL CREATININE-BSD FRML MDRD: 106 ML/MIN/1.73SQ M
GLUCOSE P FAST SERPL-MCNC: 97 MG/DL (ref 65–99)
HBA1C MFR BLD: 6 %
HDLC SERPL-MCNC: 59 MG/DL
LDLC SERPL CALC-MCNC: 146 MG/DL (ref 0–100)
POTASSIUM SERPL-SCNC: 3.7 MMOL/L (ref 3.5–5.3)
SODIUM SERPL-SCNC: 139 MMOL/L (ref 135–147)
TRIGL SERPL-MCNC: 110 MG/DL (ref ?–150)

## 2025-06-11 PROCEDURE — 99214 OFFICE O/P EST MOD 30 MIN: CPT

## 2025-06-11 PROCEDURE — 80061 LIPID PANEL: CPT

## 2025-06-11 PROCEDURE — 36415 COLL VENOUS BLD VENIPUNCTURE: CPT

## 2025-06-11 PROCEDURE — 80048 BASIC METABOLIC PNL TOTAL CA: CPT

## 2025-06-11 PROCEDURE — 83036 HEMOGLOBIN GLYCOSYLATED A1C: CPT

## 2025-06-11 RX ORDER — CHLORTHALIDONE 25 MG/1
25 TABLET ORAL DAILY
Qty: 90 TABLET | Refills: 1 | Status: SHIPPED | OUTPATIENT
Start: 2025-06-11

## 2025-06-11 RX ORDER — AMLODIPINE BESYLATE 10 MG/1
10 TABLET ORAL DAILY
Qty: 90 TABLET | Refills: 1 | Status: SHIPPED | OUTPATIENT
Start: 2025-06-11

## 2025-06-11 NOTE — ASSESSMENT & PLAN NOTE
BP noted to be borderline controlled today in office., Patient denies symptoms related to his blood pressure such as chest pain, shortness of breath, lightheadedness, dizziness, headaches or any visual changes at this time., Exam notable for S1, S2 without murmur, lungs are clear to auscultation, and no edema appreciated.    Patient reports he is not taking medications regularly as instructed.He reports he did not receive the amlodipine from the pharmacy after his last encounter so he has only been taking the chlorthalidone. He reports the losartan was discontinued at his last visit.  He denies medication side effects., He is not performing home BP monitoring.     BP Readings from Last 3 Encounters:   06/11/25 138/90   04/03/25 140/90   02/24/25 140/100       Current medication regimen: amlodipine (Norvasc) 10 mg daily and chlorthalidone 25 mg daily    PLAN:  No medication changes. Continue current regimen  Labs ordered: basic metabolic panel. Instructed patient to complete labs today   Counseled on dietary sodium restriction.  Counseled on regular aerobic exercise.  Advised to check blood pressures daily and record. Bring blood pressure log to next visit.   Educational handouts given on AVS  Follow up in 3 months or sooner as needed.    Orders:    amLODIPine (NORVASC) 10 mg tablet; Take 1 tablet (10 mg total) by mouth daily    chlorthalidone 25 mg tablet; Take 1 tablet (25 mg total) by mouth daily

## 2025-06-11 NOTE — PROGRESS NOTES
Name: Jake Baugh      : 1985      MRN: [id number]  Encounter Provider: CHRISTINA Garrido  Encounter Date: 2025   Encounter department: [facility]  :  Assessment & Plan  Primary hypertension  BP noted to be borderline controlled today in office., Patient denies symptoms related to his blood pressure such as chest pain, shortness of breath, lightheadedness, dizziness, headaches or any visual changes at this time., Exam notable for S1, S2 without murmur, lungs are clear to auscultation, and no edema appreciated.    Patient reports he is not taking medications regularly as instructed.He reports he did not receive the amlodipine from the pharmacy after his last encounter so he has only been taking the chlorthalidone. He reports the losartan was discontinued at his last visit.  He denies medication side effects., He is not performing home BP monitoring.     BP Readings from Last 3 Encounters:   25 138/90   25 140/90   25 140/100       Current medication regimen: amlodipine (Norvasc) 10 mg daily and chlorthalidone 25 mg daily    PLAN:  No medication changes. Continue current regimen  Labs ordered: basic metabolic panel. Instructed patient to complete labs today   Counseled on dietary sodium restriction.  Counseled on regular aerobic exercise.  Advised to check blood pressures daily and record. Bring blood pressure log to next visit.   Educational handouts given on AVS  Follow up in 3 months or sooner as needed.    Orders:    amLODIPine (NORVASC) 10 mg tablet; Take 1 tablet (10 mg total) by mouth daily    chlorthalidone 25 mg tablet; Take 1 tablet (25 mg total) by mouth daily    Hypokalemia  Patient had hypokalemia after starting chlorithalidone in February. Potassium was 2.9. He was instructed to take potassium supplements daily which he has been taking OTC.   Will recheck BMP to ensure potassium level is stable.   He denies chest pain, palpitations,  muscle weakness, muscle tremors/twitches.    Orders:    Basic metabolic panel; Future    Screening for diabetes mellitus (DM)  Last BMP had an elevated random glucose level.   Will check A1C.     Orders:    Hemoglobin A1C; Future    Screening, lipid  Patient has a lipid panel ordered from last encounter with PCP. No history of FLP being checked. Encouraged to complete today as patient is fasting.                 History of Present Illness         Jake Baugh is a 39 year old male with a history of hypertension. He presents to the office today for HTN follow up. At this last visit with PCP in February, his medication regimen was adjusted to amlodipine 10 mg and chlorthalidone 25 mg daily. He reports he did not receive the amlodipine from the pharmacy and therefore has not been taking it. He has a BP monitor at home, but does not use it. He denies any medication side effects. He denies chest pain, shortness of breath, palpitations, muscle weakness, muscle tremor/twitching. He is following a low sodium diet.       Review of Systems   Constitutional:  Negative for chills and fever.   HENT:  Negative for ear pain and sore throat.    Eyes:  Negative for pain and visual disturbance.   Respiratory:  Negative for cough and shortness of breath.    Cardiovascular:  Negative for chest pain and palpitations.   Gastrointestinal:  Negative for abdominal pain and vomiting.   Genitourinary:  Negative for dysuria and hematuria.   Musculoskeletal:  Negative for arthralgias and back pain.   Skin:  Negative for color change and rash.   Neurological:  Negative for seizures and syncope.   All other systems reviewed and are negative.      Objective   /90 (BP Location: Right arm, Patient Position: Sitting, Cuff Size: Standard)   Pulse 74   Temp 98.4 °F (36.9 °C) (Temporal)   Resp 18   Ht 6' (1.829 m)   Wt 96.2 kg (212 lb)   SpO2 98%   BMI 28.75 kg/m²      Physical Exam  Vitals and nursing note reviewed.   Constitutional:        General: He is not in acute distress.     Appearance: He is well-developed.   HENT:      Head: Normocephalic and atraumatic.     Eyes:      Conjunctiva/sclera: Conjunctivae normal.       Cardiovascular:      Rate and Rhythm: Normal rate and regular rhythm.      Heart sounds: No murmur heard.  Pulmonary:      Effort: Pulmonary effort is normal. No respiratory distress.      Breath sounds: Normal breath sounds.   Abdominal:      Palpations: Abdomen is soft.      Tenderness: There is no abdominal tenderness.     Musculoskeletal:         General: No swelling.      Cervical back: Neck supple.     Skin:     General: Skin is warm and dry.      Capillary Refill: Capillary refill takes less than 2 seconds.     Neurological:      Mental Status: He is alert.     Psychiatric:         Mood and Affect: Mood normal.

## 2025-06-12 ENCOUNTER — RESULTS FOLLOW-UP (OUTPATIENT)
Dept: FAMILY MEDICINE CLINIC | Facility: CLINIC | Age: 40
End: 2025-06-12

## 2025-06-16 ENCOUNTER — RESULTS FOLLOW-UP (OUTPATIENT)
Dept: OTHER | Facility: HOSPITAL | Age: 40
End: 2025-06-16